# Patient Record
Sex: FEMALE | Race: BLACK OR AFRICAN AMERICAN | Employment: FULL TIME | URBAN - METROPOLITAN AREA
[De-identification: names, ages, dates, MRNs, and addresses within clinical notes are randomized per-mention and may not be internally consistent; named-entity substitution may affect disease eponyms.]

---

## 2019-06-14 DIAGNOSIS — R00.0 TACHYCARDIA: Primary | ICD-10-CM

## 2019-06-17 ENCOUNTER — NURSE ONLY (OUTPATIENT)
Dept: CARDIOLOGY CLINIC | Age: 54
End: 2019-06-17

## 2019-06-17 DIAGNOSIS — R00.0 TACHYCARDIA: Primary | ICD-10-CM

## 2019-06-17 PROCEDURE — 0296T PR EXT ECG > 48HR TO 21 DAY RCRD W/CONECT INTL RCRD: CPT | Performed by: INTERNAL MEDICINE

## 2019-06-20 ENCOUNTER — TELEPHONE (OUTPATIENT)
Dept: CARDIOLOGY CLINIC | Age: 54
End: 2019-06-20

## 2019-06-20 NOTE — TELEPHONE ENCOUNTER
Pt took the monitor off. Pt said it started falling off and her skin was starting to bubble. Pt has mailed the monitor.

## 2019-06-20 NOTE — TELEPHONE ENCOUNTER
Per Dr. Javier richards to return monitor. We will discuss her palpitations at her visit on 7/26/19.

## 2019-06-25 PROCEDURE — 0298T PR EXT ECG > 48HR TO 21 DAY REVIEW AND INTERPRETATN: CPT | Performed by: INTERNAL MEDICINE

## 2019-06-26 ENCOUNTER — TELEPHONE (OUTPATIENT)
Dept: CARDIOLOGY CLINIC | Age: 54
End: 2019-06-26

## 2019-06-26 DIAGNOSIS — R00.0 TACHYCARDIA: ICD-10-CM

## 2019-06-26 NOTE — TELEPHONE ENCOUNTER
Dr Jc Torres this patient's Cardia Monitor have been scanned to her chart. Please give final findings.  Thanks

## 2019-06-26 NOTE — TELEPHONE ENCOUNTER
Monitor shows normal rhythm with rare premature beats from the top chambers of the heart of no clinical concern in terms of harm.

## 2019-06-27 NOTE — TELEPHONE ENCOUNTER
Spoke to pt with monitor results per [facility]. She verbalized understanding. The pt has noticed a fluctuation in BP recently. She will continue to monitor her BP and bring a log when she comes to see [facility] 7/26/19.

## 2019-07-25 PROBLEM — I10 ESSENTIAL HYPERTENSION: Status: ACTIVE | Noted: 2019-07-25

## 2019-07-26 ENCOUNTER — OFFICE VISIT (OUTPATIENT)
Dept: CARDIOLOGY CLINIC | Age: 54
End: 2019-07-26
Payer: COMMERCIAL

## 2019-07-26 VITALS
SYSTOLIC BLOOD PRESSURE: 110 MMHG | BODY MASS INDEX: 33.49 KG/M2 | HEIGHT: 62 IN | HEART RATE: 86 BPM | DIASTOLIC BLOOD PRESSURE: 80 MMHG | WEIGHT: 182 LBS

## 2019-07-26 DIAGNOSIS — R00.0 TACHYCARDIA: ICD-10-CM

## 2019-07-26 DIAGNOSIS — R00.2 PALPITATIONS: ICD-10-CM

## 2019-07-26 DIAGNOSIS — I10 ESSENTIAL HYPERTENSION: Primary | ICD-10-CM

## 2019-07-26 PROCEDURE — 99204 OFFICE O/P NEW MOD 45 MIN: CPT | Performed by: INTERNAL MEDICINE

## 2019-07-26 RX ORDER — METOPROLOL SUCCINATE 50 MG/1
50 TABLET, EXTENDED RELEASE ORAL DAILY
Qty: 90 TABLET | Refills: 3 | Status: SHIPPED | OUTPATIENT
Start: 2019-07-26 | End: 2019-11-13 | Stop reason: SDUPTHER

## 2019-07-26 RX ORDER — METOPROLOL SUCCINATE 25 MG/1
TABLET, EXTENDED RELEASE ORAL
Refills: 2 | COMMUNITY
Start: 2019-07-10 | End: 2019-07-26

## 2019-07-26 RX ORDER — AMLODIPINE BESYLATE 5 MG/1
TABLET ORAL
COMMUNITY
Start: 2018-12-27 | End: 2019-11-13 | Stop reason: SDUPTHER

## 2019-07-26 RX ORDER — NICOTINE POLACRILEX 2 MG
1 GUM BUCCAL
COMMUNITY
End: 2020-09-15

## 2019-07-26 RX ORDER — METHOCARBAMOL 500 MG/1
TABLET, FILM COATED ORAL
COMMUNITY
Start: 2018-11-27 | End: 2019-11-07 | Stop reason: ALTCHOICE

## 2019-07-26 SDOH — HEALTH STABILITY: MENTAL HEALTH: HOW OFTEN DO YOU HAVE A DRINK CONTAINING ALCOHOL?: NEVER

## 2019-07-26 NOTE — PROGRESS NOTES
[address]    2019    Chadd Jorge (:  1965) is a 47 y.o. female who is self referred to establish with our practice for management of hypertension, palpitations, and shortness of breath. Referring Provider: No primary care provider on file. HISTORY:  Ms Jamin Calderon was followed by a cardiologist in [facility]. She is now spending most of her time in the [facility] and wants to get established with a cardiologist at [facility]. According to the [place] cardiology records, she has a history of hypertension, tachycardia, shortness of breath. In 2018, Amlodipine and HCTZ (developed \"alligator\" skin and stopped taking HCTZ) and added to Metoprolol for blood pressure control. 2018 Holter demonstrated SR with avg HR 62, < 1% PAC and PVC burden. Her son  from complications of malignant hypertensive heart disease    Today, she is here for cardiac evaluation of hypertension and palpitations. She enjoys going to the gym and her usual routine is to exercise for 45-60 minutes per session which includes stretching, strength training, and would finish with cardio. Over the past 6-8 weeks, she noticed heart rates increasing to 160's with exercise (per [employer]) which was unusual for her. She noted both heart racing and heart pounding, associated with shortness of breath when exercising. She denies exertional chest pain, shortness of breath with normal activities, stairs etc.  She cut back on exercising until her appointment today. She wore a cardiac monitor for 2-3 days, but she is allergic to Latex and the patch irritated her skin. She did not experience significant symptoms when wearing the monitor, but symptoms have become more frequent since then. She does not smoke and avoids caffeine or energy drinks. Her BP tends to be higher in the morning and with exercise. Since she has not been exercising as much, her BP seems to be better controlled.   BP at home can be

## 2019-07-26 NOTE — PATIENT INSTRUCTIONS
1.  Target heart rate with exercise 130-140's  2.  Start with aerobic exercise for two weeks then incorporate strength training afterwards  3. No change in Amlodipine dose (5 mg daily)  4. Increase Metoprolol XL to 50 mg daily and continue to monitor heart rate and blood pressure. Send message through [id number] in a few weeks with an update  5.   Follow up in November 2019

## 2019-08-16 ENCOUNTER — TELEPHONE (OUTPATIENT)
Dept: CARDIOLOGY CLINIC | Age: 54
End: 2019-08-16

## 2019-10-29 ENCOUNTER — TELEPHONE (OUTPATIENT)
Dept: CARDIOLOGY CLINIC | Age: 54
End: 2019-10-29

## 2019-11-07 ENCOUNTER — OFFICE VISIT (OUTPATIENT)
Dept: CARDIOLOGY CLINIC | Age: 54
End: 2019-11-07
Payer: COMMERCIAL

## 2019-11-07 VITALS
BODY MASS INDEX: 34.37 KG/M2 | WEIGHT: 186.8 LBS | OXYGEN SATURATION: 95 % | HEIGHT: 62 IN | SYSTOLIC BLOOD PRESSURE: 112 MMHG | DIASTOLIC BLOOD PRESSURE: 82 MMHG | HEART RATE: 65 BPM

## 2019-11-07 DIAGNOSIS — R00.0 TACHYCARDIA: ICD-10-CM

## 2019-11-07 DIAGNOSIS — G47.33 OSA (OBSTRUCTIVE SLEEP APNEA): ICD-10-CM

## 2019-11-07 DIAGNOSIS — R06.02 SHORTNESS OF BREATH: ICD-10-CM

## 2019-11-07 DIAGNOSIS — I10 ESSENTIAL HYPERTENSION: Primary | ICD-10-CM

## 2019-11-07 DIAGNOSIS — R00.2 PALPITATIONS: ICD-10-CM

## 2019-11-07 DIAGNOSIS — R06.02 EXERTIONAL SHORTNESS OF BREATH: ICD-10-CM

## 2019-11-07 DIAGNOSIS — R00.1 BRADYCARDIA: ICD-10-CM

## 2019-11-07 PROCEDURE — 99214 OFFICE O/P EST MOD 30 MIN: CPT | Performed by: INTERNAL MEDICINE

## 2019-11-07 PROCEDURE — 0296T PR EXT ECG > 48HR TO 21 DAY RCRD W/CONECT INTL RCRD: CPT | Performed by: INTERNAL MEDICINE

## 2019-11-07 RX ORDER — HYDROCHLOROTHIAZIDE 12.5 MG/1
TABLET ORAL
COMMUNITY
Start: 2018-12-27 | End: 2020-09-11 | Stop reason: ALTCHOICE

## 2019-11-08 ENCOUNTER — TELEPHONE (OUTPATIENT)
Dept: CARDIOLOGY CLINIC | Age: 54
End: 2019-11-08

## 2019-11-11 ENCOUNTER — TELEPHONE (OUTPATIENT)
Dept: CARDIOLOGY CLINIC | Age: 54
End: 2019-11-11

## 2019-11-12 ENCOUNTER — OFFICE VISIT (OUTPATIENT)
Dept: PULMONOLOGY | Age: 54
End: 2019-11-12
Payer: COMMERCIAL

## 2019-11-12 VITALS
WEIGHT: 191 LBS | OXYGEN SATURATION: 99 % | BODY MASS INDEX: 35.15 KG/M2 | HEIGHT: 62 IN | HEART RATE: 49 BPM | SYSTOLIC BLOOD PRESSURE: 119 MMHG | DIASTOLIC BLOOD PRESSURE: 84 MMHG

## 2019-11-12 DIAGNOSIS — E66.9 NON MORBID OBESITY, UNSPECIFIED OBESITY TYPE: Chronic | ICD-10-CM

## 2019-11-12 DIAGNOSIS — I10 ESSENTIAL HYPERTENSION: Chronic | ICD-10-CM

## 2019-11-12 DIAGNOSIS — G47.33 OBSTRUCTIVE SLEEP APNEA (ADULT) (PEDIATRIC): Primary | ICD-10-CM

## 2019-11-12 PROCEDURE — 99204 OFFICE O/P NEW MOD 45 MIN: CPT | Performed by: INTERNAL MEDICINE

## 2019-11-12 ASSESSMENT — ENCOUNTER SYMPTOMS
PHOTOPHOBIA: 0
CHEST TIGHTNESS: 0
APNEA: 0
ALLERGIC/IMMUNOLOGIC NEGATIVE: 1
ABDOMINAL DISTENTION: 0
VOMITING: 0
ABDOMINAL PAIN: 0
CHOKING: 0
NAUSEA: 0
SHORTNESS OF BREATH: 0
RHINORRHEA: 0
EYE PAIN: 0

## 2019-11-12 ASSESSMENT — SLEEP AND FATIGUE QUESTIONNAIRES
HOW LIKELY ARE YOU TO NOD OFF OR FALL ASLEEP WHILE SITTING AND TALKING TO SOMEONE: 0
HOW LIKELY ARE YOU TO NOD OFF OR FALL ASLEEP WHILE SITTING INACTIVE IN A PUBLIC PLACE: 0
HOW LIKELY ARE YOU TO NOD OFF OR FALL ASLEEP WHILE WATCHING TV: 0
NECK CIRCUMFERENCE (INCHES): 15
HOW LIKELY ARE YOU TO NOD OFF OR FALL ASLEEP WHEN YOU ARE A PASSENGER IN A CAR FOR AN HOUR WITHOUT A BREAK: 0
ESS TOTAL SCORE: 0
HOW LIKELY ARE YOU TO NOD OFF OR FALL ASLEEP WHILE SITTING AND READING: 0
HOW LIKELY ARE YOU TO NOD OFF OR FALL ASLEEP IN A CAR, WHILE STOPPED FOR A FEW MINUTES IN TRAFFIC: 0
HOW LIKELY ARE YOU TO NOD OFF OR FALL ASLEEP WHILE SITTING QUIETLY AFTER LUNCH WITHOUT ALCOHOL: 0
HOW LIKELY ARE YOU TO NOD OFF OR FALL ASLEEP WHILE LYING DOWN TO REST IN THE AFTERNOON WHEN CIRCUMSTANCES PERMIT: 0

## 2019-11-13 RX ORDER — METOPROLOL SUCCINATE 50 MG/1
50 TABLET, EXTENDED RELEASE ORAL DAILY
Qty: 90 TABLET | Refills: 3 | Status: SHIPPED | OUTPATIENT
Start: 2019-11-13 | End: 2020-02-03 | Stop reason: SDUPTHER

## 2019-11-13 RX ORDER — AMLODIPINE BESYLATE 5 MG/1
5 TABLET ORAL DAILY
Qty: 90 TABLET | Refills: 3 | Status: SHIPPED | OUTPATIENT
Start: 2019-11-13 | End: 2020-02-03 | Stop reason: SDUPTHER

## 2019-11-21 ENCOUNTER — TELEPHONE (OUTPATIENT)
Dept: CARDIOLOGY CLINIC | Age: 54
End: 2019-11-21

## 2019-11-21 ENCOUNTER — HOSPITAL ENCOUNTER (OUTPATIENT)
Dept: NON INVASIVE DIAGNOSTICS | Age: 54
Discharge: HOME | End: 2019-11-21
Payer: COMMERCIAL

## 2019-11-21 DIAGNOSIS — R06.02 SHORTNESS OF BREATH: ICD-10-CM

## 2019-11-21 LAB
LV EF: 50 %
LVEF MODALITY: NORMAL

## 2019-11-21 PROCEDURE — 93351 STRESS TTE COMPLETE: CPT

## 2019-11-21 PROCEDURE — 93320 DOPPLER ECHO COMPLETE: CPT

## 2019-11-22 ENCOUNTER — OFFICE VISIT (OUTPATIENT)
Dept: CARDIOLOGY CLINIC | Age: 54
End: 2019-11-22
Payer: COMMERCIAL

## 2019-11-22 ENCOUNTER — HOSPITAL ENCOUNTER (OUTPATIENT)
Age: 54
Discharge: HOME | End: 2019-11-22
Payer: COMMERCIAL

## 2019-11-22 VITALS
BODY MASS INDEX: 34.78 KG/M2 | WEIGHT: 189 LBS | DIASTOLIC BLOOD PRESSURE: 74 MMHG | RESPIRATION RATE: 18 BRPM | HEART RATE: 69 BPM | SYSTOLIC BLOOD PRESSURE: 127 MMHG | HEIGHT: 62 IN

## 2019-11-22 DIAGNOSIS — R00.2 PALPITATIONS: ICD-10-CM

## 2019-11-22 DIAGNOSIS — E66.9 NON MORBID OBESITY, UNSPECIFIED OBESITY TYPE: Chronic | ICD-10-CM

## 2019-11-22 DIAGNOSIS — R00.0 TACHYCARDIA: Primary | ICD-10-CM

## 2019-11-22 DIAGNOSIS — G47.33 OSA (OBSTRUCTIVE SLEEP APNEA): Chronic | ICD-10-CM

## 2019-11-22 DIAGNOSIS — I10 ESSENTIAL HYPERTENSION: Chronic | ICD-10-CM

## 2019-11-22 DIAGNOSIS — R00.1 BRADYCARDIA: ICD-10-CM

## 2019-11-22 DIAGNOSIS — R00.0 TACHYCARDIA: ICD-10-CM

## 2019-11-22 LAB — TSH REFLEX: 1.56 UIU/ML (ref 0.27–4.2)

## 2019-11-22 PROCEDURE — 93000 ELECTROCARDIOGRAM COMPLETE: CPT | Performed by: INTERNAL MEDICINE

## 2019-11-22 PROCEDURE — 84443 ASSAY THYROID STIM HORMONE: CPT

## 2019-11-22 PROCEDURE — 36415 COLL VENOUS BLD VENIPUNCTURE: CPT

## 2019-11-22 PROCEDURE — 99204 OFFICE O/P NEW MOD 45 MIN: CPT | Performed by: INTERNAL MEDICINE

## 2019-11-22 PROCEDURE — 0298T PR EXT ECG > 48HR TO 21 DAY REVIEW AND INTERPRETATN: CPT | Performed by: INTERNAL MEDICINE

## 2019-11-22 RX ORDER — MAGNESIUM OXIDE 400 MG/1
400 TABLET ORAL DAILY
Qty: 90 TABLET | Refills: 3
Start: 2019-11-22 | End: 2020-09-15

## 2019-11-27 ENCOUNTER — TELEPHONE (OUTPATIENT)
Dept: CARDIOLOGY CLINIC | Age: 54
End: 2019-11-27

## 2020-01-28 NOTE — PROGRESS NOTES
[address]    2/3/2020    Ivan Islas (:  1965) is a 49 y.o. female who is here for follow up on her history of hypertension and palpitations and to review results of diagnostic testing since last visit. Referring Provider: Don Leonadro MD    HISTORY:  Ms Popeye Bean has a history of hypertension, tachycardia, and shortness of breath. She has sleep apnea treated with CPAP. She was previously followed by a cardiologist in [place], [place]. Her son  from complications of malignant hypertensive heart disease. In 2018, Amlodipine and HCTZ were added to Metoprolol XL, however she stopped taking HCTZ after she developed  \"alligator\" skin. BP at home can be as high as 149-150/90. She also noticed better blood pressure control when taking BP meds twice a day. 2018 Holter demonstrated SR with avg HR 62, < 1% PAC and PVC burden. Earlier in , she noticed her heart rate would increase to 160 bpm with exercise which was unusual for her. She noted both heart racing and heart pounding, associated with shortness of breath when exercising. She denied exertional chest pain, shortness of breath with normal activities, stairs etc.  She is experiencing more palpitations than she did when she wore the cardiac monitor. She will be going to [place] for six months for her job with the [employer]. 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 She has used HCTZ in the past, but has an allergy to sulfa. Today, she states she tries to avoid use of anti-histamines to avoid triggering palpitations. She does not wear her Apple watch anymore as it did not monitor her heart rate accurately. She recently started exercising again. She denies exertional chest pain or shortness of breath. She has not been bothered by palpitations recently. She has not been taking HCTZ regularly, but will take it for worsening swelling.   She will be Alcohol use: Never     Frequency: Never        Family History   Problem Relation Age of Onset    Hypertension Mother     Cancer Mother     Hypertension Father     Diabetes Father     Cancer Father        PHYSICAL EXAMINATION:  Vitals:    02/03/20 0905   BP: 110/70   Site: Left Upper Arm   Position: Sitting   Cuff Size: Large Adult   Pulse: 66   Resp: 18   SpO2: 98%   Weight: 196 lb 1.9 oz (89 kg)   Height: 5' 2\" (1.575 m)     Estimated body mass index is 35.87 kg/m² as calculated from the following:    Height as of this encounter: 5' 2\" (1.575 m). Weight as of this encounter: 196 lb 1.9 oz (89 kg). General Appearance: No apparent distress  Eyes:  · Conjunctiva clear  · Pupils equal, round, reactive to light  ENT:  · External Ears and Nose unremarkable  · Oral mucosa is moist  Respiratory:  · Normal excursion and expansion without use of accessory muscles  · Resp Auscultation: Normal breath sounds without dullness  Cardiovascular:  · JVD is normal  · The carotid upstroke is normal in amplitude and contour without delay or bruit  · Apical impulse is not displaced  · Normal S1, S2. No S3. No Murmur. Regular rate and rhythm - no change in medication  · No edema  · Pedal Pulses: 2+ and equal   Abdomen:  · No masses or tenderness  · Liver/Spleen: No Abnormalities Noted  Musculoskeletal:  · Fingers without clubbing or cyanosis  · Normal Gait  Skin:  · No rash  · Normal skin turgor   Neurologic/Psychiatric:  · Alert and oriented in all spheres  · Normal mood and affect  · Memory and mentation intact      I have reviewed all pertinent lab results and diagnostic testing. Holter monitor (7 days) 11/22/19 -- SR with avg HR 66 (). PAC and PVC burden < 1%    Stress echo 11/21/19:  Summary   Normal stress echocardiogram study. Exercises 9 min, 42 sec.   Peak HR with exercise - 167 bpm    Echo 11/21/19:   Summary   -Normal left ventricle size, wall thickness, and systolic function with an   estimated

## 2020-02-03 ENCOUNTER — OFFICE VISIT (OUTPATIENT)
Dept: CARDIOLOGY CLINIC | Age: 55
End: 2020-02-03
Payer: COMMERCIAL

## 2020-02-03 VITALS
OXYGEN SATURATION: 98 % | SYSTOLIC BLOOD PRESSURE: 110 MMHG | HEART RATE: 66 BPM | RESPIRATION RATE: 18 BRPM | HEIGHT: 62 IN | BODY MASS INDEX: 36.09 KG/M2 | WEIGHT: 196.12 LBS | DIASTOLIC BLOOD PRESSURE: 70 MMHG

## 2020-02-03 PROCEDURE — 99213 OFFICE O/P EST LOW 20 MIN: CPT | Performed by: INTERNAL MEDICINE

## 2020-02-03 RX ORDER — METOPROLOL SUCCINATE 50 MG/1
50 TABLET, EXTENDED RELEASE ORAL DAILY
Qty: 210 TABLET | Refills: 1 | Status: SHIPPED | OUTPATIENT
Start: 2020-02-03 | End: 2020-07-09

## 2020-02-03 RX ORDER — AMLODIPINE BESYLATE 5 MG/1
5 TABLET ORAL DAILY
Qty: 210 TABLET | Refills: 1 | Status: SHIPPED | OUTPATIENT
Start: 2020-02-03 | End: 2020-02-05

## 2020-02-03 NOTE — LETTER
[address] Cardiology [facility]  [address] [address]  Phone: [phone]  Fax: [phone]    Jona Jade MD        2020     Marge Salinas MD  [address] [id number]    Patient: Estelle Pino  MR Number: [id number]  YOB: 1965  Date of Visit: 2/3/2020    Dear Dr. Marge Salinas:    Below are the relevant portions of my assessment and plan of care. [address]    2/3/2020    Estelle Pino (:  1965) is a 49 y.o. female who is here for follow up on her history of hypertension and palpitations and to review results of diagnostic testing since last visit. Referring Provider: Marge Salinas MD    HISTORY:  Ms Aguilar Agee has a history of hypertension, tachycardia, and shortness of breath. She has sleep apnea treated with CPAP. She was previously followed by a cardiologist in [place], [place]. Her son  from complications of malignant hypertensive heart disease. In 2018, Amlodipine and HCTZ were added to Metoprolol XL, however she stopped taking HCTZ after she developed  \"alligator\" skin. BP at home can be as high as 149-150/90. She also noticed better blood pressure control when taking BP meds twice a day. 2018 Holter demonstrated SR with avg HR 62, < 1% PAC and PVC burden. Earlier in 2019, she noticed her heart rate would increase to 160 bpm with exercise which was unusual for her. She noted both heart racing and heart pounding, associated with shortness of breath when exercising. She denied exertional chest pain, shortness of breath with normal activities, stairs etc.  She is experiencing more palpitations than she did when she wore the cardiac monitor. She will be going to [place] for six months for her job with the [employer]. 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   She has used HCTZ in the past, but has an allergy to sulfa. Today, she states she tries to avoid use of anti-histamines to avoid triggering palpitations. She does not wear her Apple watch anymore as it did not monitor her heart rate accurately. She recently started exercising again. She denies exertional chest pain or shortness of breath. She has not been bothered by palpitations recently. She has not been taking HCTZ regularly, but will take it for worsening swelling. She will be leaving out of the country for six months for her work and expects to have more fluid retention on the flight to the [place].  She also wants to take 7 month supply of medications with her to [place]. She has reduced her salt/sodium intake. Patient is compliant with medications and is tolerating them well without side effects. REVIEW OF SYSTEMS:  A complete review of systems was reviewed and is negative except as noted in the history of present illness. Prior to Visit Medications    Medication Sig Taking?  Authorizing Provider   magnesium oxide (MAG-OX) 400 MG tablet Take 1 tablet by mouth daily Yes Pastor Apurva MD   metoprolol succinate (TOPROL XL) 50 MG extended release tablet Take 1 tablet by mouth daily Yes Omid Lyon MD   amLODIPine (NORVASC) 5 MG tablet Take 1 tablet by mouth daily Yes Omid Lyon MD   Omega-3 Fatty Acids (FISH OIL PO) Take 750 mg by mouth 2 times daily Yes Historical Provider, MD   Cholecalciferol (VITAMIN D-3 PO) Take 5,000 Units by mouth daily Yes Historical Provider, MD   Aluminum Chloride in Alcohol 6.25 % SOLN Apply small amount under arms qhs, avoid broken skin Yes Historical Provider, MD   Biotin 1 MG CAPS Take 1 mg by mouth Yes Historical Provider, MD   aspirin 81 MG tablet Take 81 mg by mouth daily Yes Historical Provider, MD   Multiple Vitamin (MULTI VITAMIN DAILY PO) Take 1 tablet by mouth daily Yes Historical Provider, MD Neurologic/Psychiatric:  · Alert and oriented in all spheres  · Normal mood and affect  · Memory and mentation intact      I have reviewed all pertinent lab results and diagnostic testing. Holter monitor (7 days) 11/22/19 -- SR with avg HR 66 (). PAC and PVC burden < 1%    Stress echo 11/21/19:  Summary   Normal stress echocardiogram study. Exercises 9 min, 42 sec. Peak HR with exercise - 167 bpm    Echo 11/21/19:   Summary   -Normal left ventricle size, wall thickness, and systolic function with an   estimated ejection fraction of 55%. -Grade I diastolic dysfunction with normal LV filling pressures. -Mild mitral regurgitation.   -Mild tricuspid regurgitation with PASP of 27 mmHg. 48 hr holter  6/17/19:  SR, avg HR 70 ( ). Rare PVCs noted, Rare PACs. Carotid duplex 2/2019:  Normal carotid arteries bilaterally    Plain GXT 2/2018 : walked 9 minutes, 43 seconds. ECG negative for ischemia    Echo 12/2018: LV normal size and wall thickness. LVEF hyperdynamic > 70%, impaired LV relaxation. Trace MR and TR    Holter monitor 12/2018:  SR with avg HR 62 (), PVC burden < 1% (80 beats); PAC burden < 1% 995 beats)      ASSESSMENT/PLAN:    1. Essential hypertension  ~ at initial visit patient reported BP that are higher in the morning and after exercising.    ~ Metoprolol XL 50 mg daily, Amlodipine 5 mg daily. Tolerating medications without side effects  ~ Blood pressure 110/70, pulse 66, resp. rate 18, height 5' 2\" (1.575 m), weight 196 lb 1.9 oz (89 kg), SpO2 98 %, not currently breastfeeding. Plan > BP well controlled, continue present management. 2. Tachycardia/palpitations  3.   Exertional shortness of breath   ~ noticed heart racing and pounding with HR as high as 160's with exercise.    ~ rare PVCs noted on Holter monitor in 12/2018, no PVCs noted on recent cardiac monitor in June 2019, but wore it less than 3 days due to skin irritation ~ previous visit - reported fluctuation in HR which is as low as 38 bpm at night and can spike up to 160 bpm when walking 1-2 mph on the treadmill. HR low 100's with normal activities around the house. She felt winded at times with HR low 100's and spikes up to 160's  ~ 11/2019 TSH - 1.56  ~ 11/2019 Holter monitor - SR with avg HR 66 (), no significant arrhythmias  ~ Dr. Milagros Rosario recommended Magnesium oxide 400 mg daily for palpitations. Tolerating medications without side effects  ~ 11/21/19 stress echo normal.  Good exercise tolerance (over 9 minutes), peak HR was exercise - 167.    ~ no concerning palpitations recently  ~ discuss avoiding antihistamines or decongestants with \"D\" or \"DM\"    Plan >  Okay to exercise without limitations. Okay for HR to increase to 85% of predicted heart rate. Can take extra 1/2 tab of Metoprolol as needed for increased palpitations. 4.  MACK  ~ treated with CPAP - is compliant with use  ~ 11/2019 holter monitor showed low HR of 50 bpm    Plan > continue use of CPAP      Plan:  1. No change in medications  2. Can take extra 1/2 tab Metoprolol as needed for worsening palpitations. Hold dose if heart rate is less than 50.    3.  Can hold Amlodipine if SBP (top #) is less than 100 mmHg or if you take extra 1/2 tab of Metoprolol for worsening palpitations. 4.  Avoid medication with \"D\" in it. Okay to take Claritin, Zyrtec etc.   5.  Okay to exercise - no limitations. Can increase heart rate to 85% of predicted heart rate. Acceptable BP less than 140/90.    6.  Will refill meds so patient can take 7 month supply of meds with her when traveling for work. 7.  Follow up in Oct-Nov 2020       Scribe's attestation: This note was scribed in the presence of Sivan Kinney M.D. by Clover Farmer RN    Physician Attestation: The scribe's documentation has been prepared under my direction and personally reviewed by me in its entirety.   I confirm that the note above accurately reflects all work, treatment, procedures, and medical decision making performed by me. An  electronic signature was used to authenticate this note. Juana Chin MD, Tarsha Dickson      If you have questions, please do not hesitate to call me. I look forward to following Christian Fortune along with you.     Sincerely,        Iam Huizar MD

## 2020-02-05 RX ORDER — AMLODIPINE BESYLATE 5 MG/1
TABLET ORAL
Qty: 210 TABLET | Refills: 1 | Status: SHIPPED | OUTPATIENT
Start: 2020-02-05 | End: 2021-08-20 | Stop reason: SDUPTHER

## 2020-07-09 NOTE — TELEPHONE ENCOUNTER
Requested Prescriptions     Pending Prescriptions Disp Refills    metoprolol succinate (TOPROL XL) 50 MG extended release tablet [Pharmacy Med Name: METOPROLOL ER SUCCINATE 50MG TABS] 90 tablet      Sig: TAKE 1 TABLET BY MOUTH DAILY          Number: 90    Refills: 3    Last Office Visit: 2/3/2020     Next Office Visit: Visit date not found

## 2020-07-10 RX ORDER — METOPROLOL SUCCINATE 50 MG/1
50 TABLET, EXTENDED RELEASE ORAL DAILY
Qty: 90 TABLET | Refills: 3 | Status: SHIPPED | OUTPATIENT
Start: 2020-07-10 | End: 2020-09-15

## 2020-09-11 ENCOUNTER — VIRTUAL VISIT (OUTPATIENT)
Dept: PRIMARY CARE CLINIC | Age: 55
End: 2020-09-11
Payer: COMMERCIAL

## 2020-09-11 DIAGNOSIS — M12.9 ARTHROPATHY: ICD-10-CM

## 2020-09-11 DIAGNOSIS — R68.2 DRY MOUTH: ICD-10-CM

## 2020-09-11 PROBLEM — R00.0 TACHYCARDIA: Status: RESOLVED | Noted: 2019-07-26 | Resolved: 2020-09-11

## 2020-09-11 PROBLEM — R00.2 PALPITATIONS: Status: RESOLVED | Noted: 2019-07-26 | Resolved: 2020-09-11

## 2020-09-11 PROBLEM — R06.02 EXERTIONAL SHORTNESS OF BREATH: Status: RESOLVED | Noted: 2019-11-07 | Resolved: 2020-09-11

## 2020-09-11 PROCEDURE — 99203 OFFICE O/P NEW LOW 30 MIN: CPT | Performed by: INTERNAL MEDICINE

## 2020-09-11 ASSESSMENT — ENCOUNTER SYMPTOMS
WHEEZING: 0
SHORTNESS OF BREATH: 0
RHINORRHEA: 0
NAUSEA: 0
VISUAL CHANGE: 0
VOMITING: 0
COUGH: 0
TROUBLE SWALLOWING: 0
CHEST TIGHTNESS: 0
ABDOMINAL PAIN: 0
EYE PAIN: 0
SORE THROAT: 0
EYE DISCHARGE: 0
SINUS PAIN: 0
BLOOD IN STOOL: 0
SWOLLEN GLANDS: 0
SINUS PRESSURE: 0

## 2020-09-11 NOTE — PATIENT INSTRUCTIONS
Keep the dentist appointment next week to see if anything else they noticed. She may have Sjogren's syndrome and I am going to order the INDRA testing with her arthritis and if it is causing dryness in the mouth and italo feeling as she is drinking enough fluids. Keep CPAP machine quite clean make sure that this not causing this problem. She may be breathing through the mouth and daytime and that could be causing  the dry mouth. If Sjogren test is negative she should see ENT specialist and if dentist does not find any other problem either.

## 2020-09-11 NOTE — PROGRESS NOTES
2020    Mavis Schmitt (: 1965) is a 49 y.o. female, here for evaluation of the following medical concerns:    Chief Complaint   Patient presents with   [address]       She had seen Dr. Marilynne Lesches and did blood work and the blood work she has seen the results and Dr. Marilynne Lesches explained to her that stop iron-containing medication and she was still questioning that and explained to her maybe iron rich foods especially the red meats she needs to quit eating those and try to eat more white meat and see how that does. She does not eat any vitamin B12 and her vitamin D was normal too. Blood work reviewed and she does not show any dehydration or elevated sugar and her liver function test is normal.      She denies any dryness in the eyes and has normal tearing but mouth dryness wonder about Sjogren's syndrome or mouth dryness and that she has dentist follow-up next week and they will check it out and if needed further checkup she may have to see ENT specialist.  She understood all those details. Hypertension    Watching low-sodium diet. Taking blood pressure medications regularly. Blood pressure checked off and on and trying to keep a goal of blood pressure less than 130/85 most of the time. Denies any chest pain / palpitation / shortness of breath / lightheadedness etc.   No results found for: NA, K, CL, CO2, BUN, CREATININE, GLUCOSE, CALCIUM        Sleep apnea--keep changing water / cleaning machine--no recent chemical change      arthritis she does have multiple joints especially knees and only taking as needed medications. Other   This is a new (mouth italo lesions x 2 months) problem. The current episode started more than 1 month ago. The problem occurs constantly. The problem has been unchanged.  Pertinent negatives include no abdominal pain, chest pain, chills, congestion, coughing, diaphoresis, fever, headaches, myalgias, nausea, neck pain, numbness, rash, sore throat, swollen glands, vertigo, visual change, vomiting or weakness. Associated symptoms comments: Throat dry. Review of Systems   Constitutional: Negative for appetite change, chills, diaphoresis, fever and unexpected weight change. HENT: Negative for congestion, ear discharge, ear pain, nosebleeds, rhinorrhea, sinus pressure, sinus pain, sore throat and trouble swallowing. Mouth dry / italo feeling   Eyes: Negative for pain and discharge. Respiratory: Negative for cough, chest tightness, shortness of breath and wheezing. Cardiovascular: Negative for chest pain, palpitations and leg swelling. Gastrointestinal: Negative for abdominal pain, blood in stool, nausea and vomiting. Endocrine: Negative for polydipsia and polyphagia. Genitourinary: Negative for difficulty urinating, enuresis, flank pain and hematuria. Musculoskeletal: Negative for myalgias and neck pain. Skin: Negative for rash. Neurological: Negative for vertigo, facial asymmetry, weakness, light-headedness, numbness and headaches. Psychiatric/Behavioral: Negative for confusion. Current Outpatient Medications on File Prior to Visit   Medication Sig Dispense Refill    metoprolol succinate (TOPROL XL) 50 MG extended release tablet TAKE 1 TABLET BY MOUTH DAILY 90 tablet 3    amLODIPine (NORVASC) 5 MG tablet TAKE 1 TABLET BY MOUTH EVERY  tablet 1    magnesium oxide (MAG-OX) 400 MG tablet Take 1 tablet by mouth daily 90 tablet 3    Omega-3 Fatty Acids (FISH OIL PO) Take 750 mg by mouth 2 times daily      Cholecalciferol (VITAMIN D-3 PO) Take 5,000 Units by mouth daily      Aluminum Chloride in Alcohol 6.25 % SOLN Apply small amount under arms qhs, avoid broken skin      Biotin 1 MG CAPS Take 1 mg by mouth      aspirin 81 MG tablet Take 81 mg by mouth daily      Multiple Vitamin (MULTI VITAMIN DAILY PO) Take 1 tablet by mouth daily       No current facility-administered medications on file prior to visit.        Allergies Allergen Reactions    Ciprofloxacin Shortness Of Breath and Palpitations    Codeine Itching    Sulfur Itching    Vicodin [Hydrocodone-Acetaminophen] Itching     Past Medical History:   Diagnosis Date    Hypertension     MACK (obstructive sleep apnea) 11/7/2019     Past Surgical History:   Procedure Laterality Date    HYSTERECTOMY      LAP BAND        Social History     Tobacco Use    Smoking status: Never Smoker    Smokeless tobacco: Never Used   Substance Use Topics    Alcohol use: Never     Frequency: Never      Family History   Problem Relation Age of Onset    Hypertension Mother     Cancer Mother     Hypertension Father     Diabetes Father     Cancer Father         There were no vitals filed for this visit. Estimated body mass index is 35.87 kg/m² as calculated from the following:    Height as of 2/3/20: 5' 2\" (1.575 m). Weight as of 2/3/20: 196 lb 1.9 oz (89 kg). Physical Exam  Constitutional:       Appearance: Normal appearance. HENT:      Nose: Nose normal.   Pulmonary:      Effort: Pulmonary effort is normal.   Musculoskeletal:      Comments: Arthritis in knee and multiple joints   Neurological:      General: No focal deficit present. Mental Status: She is alert. Psychiatric:         Mood and Affect: Mood normal.         Behavior: Behavior normal.         ASSESSMENT/PLAN:  1. Dry mouth  Keep dental checkup and Dr. Asa Shepherd follow-up. - INDRA Reflex to Antibody [place]; Future    2. Essential hypertension  Blood pressure medication    3. MACK (obstructive sleep apnea)  CPAP with proper cleaning    5. Jeannine Davies Nicely follow-up and this blood work  - INDRA Reflex to Antibody [facility]hu's; Future  Kenya Johnson is a 49 y.o. female being evaluated by a Virtual Visit (video visit) encounter to address concerns as mentioned above. A caregiver was present when appropriate.  Due to this being a TeleHealth encounter (During JIJ-53 public health emergency), evaluation of the following organ systems was limited: Vitals/Constitutional/EENT/Resp/CV/GI//MS/Neuro/Skin/Heme-Lymph-Imm. Pursuant to the emergency declaration under the [facility], [address] [place] [address] and the [employer] Act, this Virtual Visit was conducted with patient's (and/or legal guardian's) consent, to reduce the patient's risk of exposure to COVID-19 and provide necessary medical care. The patient (and/or legal guardian) has also been advised to contact this office for worsening conditions or problems, and seek emergency medical treatment and/or call 911 if deemed necessary. Patient identification was verified at the start of the visit: Yes    Total time spent for this encounter: 20 minutes 16 seconds  Services were provided through a video synchronous discussion virtually to substitute for in-person clinic visit. Patient and provider were located at their individual homes. --Luz Mera MD on 9/11/2020 at 12:16 PM    An electronic signature was used to authenticate this note. Return if symptoms worsen or fail to improve. Patient Instructions   Keep the dentist appointment next week to see if anything else they noticed. She may have Sjogren's syndrome and I am going to order the INDRA testing with her arthritis and if it is causing dryness in the mouth and italo feeling as she is drinking enough fluids. Keep CPAP machine quite clean make sure that this not causing this problem. She may be breathing through the mouth and daytime and that could be causing  the dry mouth. If Sjogren test is negative she should see ENT specialist and if dentist does not find any other problem either. Electronically signed by Luz Mera MD on 9/11/2020 at 12:16 PM     This dictation was generated by voice recognition computer software.  Although all attempts are made to edit the dictation for accuracy, there may be errors in the transcription that are not intended.

## 2020-09-12 LAB — ANTI-NUCLEAR ANTIBODY (ANA): NEGATIVE

## 2020-09-14 NOTE — PROGRESS NOTES
[facility]   Electrophysiology Follow Up  Date: 9/15/2020      CC: Tachycardia  HPI: Princess Manrique is a 49 y.o. female with a PMH of HTN and MACK. She presented to 's office on 11/7/19 with complaints of tachycardia and shortness of breath. A 7 day monitor was placed which revealed sinus rhythm and no arrhythmia. Sapna Alvarenga presents to the office d/t having symptoms. She is feeling fatigued and groggy. When she is exercising and gets half way through she has to stop to rest. She also has complaints of swelling when she is exercising. Her vitamin B-12 and  ferritin were elevated on her blood work 9/8/2020. Her PCP has stopped all her supplements. She is going to restart her magnesium. 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 We discussed that if she is exerting herself and her heart rate remains below her target heart rate of about 134 BPM she can decrease her Toprol. She logs her BP and she is concerned that her diastolic is slightly elevated in the AM.       Assessment and plan:      - Fatigue:    New complaint    Could be multifactorial related to being overweight, MACK and beta-blocker therapy   Will reduce metoprolol to 25 mg daily   Recommend weight loss and exercise   She will check her heart rate during exercise to see if she can increase it appropriately    Palpitations/Tachycardia   - Resolved. -ECG today shows Sinus rhythm   -TSH 11/22/2019 1.53   -Decrease her to Toprol XL 25 mg daily, to help with fatigue and possibly aiding in reaching her target heart rate   -magnesium 400 mg   -11/2019: 7-day outpatient Holter monitoring reviewed. Average heart rate of 66 with max heart rate of 103 minimum heart rate of 50 bpm   -Patient reports some symptoms during these 7 days of monitoring however no sustained arrhythmia has been noted. 48 hr holter  6/17/19:  SR, avg HR 70 ( ). No PVCs noted, Rare PACs.        HTN  Vitals:    09/15/20 0924   BP: 108/60   Pulse: 60   Resp: 18   SpO2: 99%    -Blood pressure is controlled   -Home BP monitoring encourage with a BP goal <130/80   -Decrease Toprol Xl to 25 mg to aid in her reaching her target HR   -Norvasc 5 mg daily, if her BP becomes uncontrolled with her decreased Toprol dose we will increase her amlodipine to 10 mg        MACK   -Uses a CPAP    Obesity  Body mass index is 34.75 kg/m². - Excessive weight is complicating assessment and treatment. It is placing patient at risk for multiple co-morbidities as well as early death and contributing to the patient's presentation. - discussed weight management with diet and exercise      Diagnostic testing  I independently reviewed the cardiac diagnostic studies. EC/15/20  Sinus rhythm    Stress Echo: 19   Results      Echo (rest): Normal (LVEF >50%)      Echo (stress): Hyperkinetic (LVEF >70%)      Echo   Baseline resting echocardiogram shows normal global LV systolic function   with an ejection fraction of 60% and uniform myocardial segmental wall   motion. Following stress there was uniform augmentation of all myocardial   segments with appropriate hyperdynamic LV systolic response to stress. ECG   Normal (Negative) response to exercise     Summary   -Normal left ventricle size, wall thickness, and systolic function with an   estimated ejection fraction of 55%. -Grade I diastolic dysfunction with normal LV filling pressures. -Mild mitral regurgitation.   -Mild tricuspid regurgitation with PASP of 27 mmHg.     Cath: none      Past Medical History:   Diagnosis Date    Hypertension     MACK (obstructive sleep apnea) 2019        Past Surgical History:   Procedure Laterality Date    HYSTERECTOMY      LAP BAND         Allergies   Allergen Reactions    Ciprofloxacin Shortness Of Breath and Palpitations    Codeine Itching    Sulfur Itching    Vicodin [Hydrocodone-Acetaminophen] Itching       Social History:   reports that she has never smoked. She has never used smokeless tobacco. She reports that she does not drink alcohol or use drugs. Family History:  family history includes Cancer in her father and mother; Diabetes in her father; Hypertension in her father and mother. Review of System:  [x] Full ROS obtained and negative except as mentioned in HPI    Physical Examination:  Vitals:    09/15/20 0924   BP: 108/60   Pulse: 60   Resp: 18   SpO2: 99%      Wt Readings from Last 3 Encounters:   09/15/20 190 lb (86.2 kg)   02/03/20 196 lb 1.9 oz (89 kg)   11/22/19 189 lb (85.7 kg)     · Constitutional: Oriented. No distress. · Head: Normocephalic and atraumatic. · Mouth/Throat: Oropharynx is clear and moist.   · Eyes: Conjunctivae normal. EOM are normal.   · Neck: Neck supple. No JVD present. · Cardiovascular: Normal rate, regular rhythm, S1&S2. · Pulmonary/Chest: Bilateral respiratory sounds. No rhonchi. · Abdominal: Soft. No tenderness. · Musculoskeletal: No tenderness. No edema    · Lymphadenopathy: Has no cervical adenopathy. · Neurological: Alert and oriented. Follows command, No Gross deficit   · Skin: Skin is warm, No rash noted. · Psychiatric: Has a normal behavior     Labs, diagnostic and imaging results reviewed. Reviewed. Lab Results   Component Value Date    TSHREFLEX 1.56 11/22/2019         Medication:  Prior to Admission medications    Medication Sig Start Date End Date Taking?  Authorizing Provider   metoprolol succinate (TOPROL XL) 25 MG extended release tablet Take 1 tablet by mouth daily 9/15/20  Yes Bre Arguello MD   amLODIPine (NORVASC) 5 MG tablet TAKE 1 TABLET BY MOUTH EVERY DAY 2/5/20  Yes Brittani Cai MD   aspirin 81 MG tablet Take 81 mg by mouth daily   Yes Historical Provider, MD     - The patient is counseled to follow a low salt diet to assure blood pressure remains controlled for cardiovascular risk factor modification.   - The patient is counseled to avoid excess caffeine, and energy drinks as this may exacerbated ectopy and arrhythmia. - The patient is counseled to get regular exercise 3-5 times per week to control cardiovascular risk factors. - The patient is counseled to lose weigt to control cardiovascular risk factors. - The patient is counseled to avoid tobacco use. Thank you for allowing me to participate in the care of Catarino Villasenor. Further evaluation will be based upon the patient's clinical course and testing results. All questions and concerns were addressed to the patient/family. Alternatives to my treatment were discussed. I have discussed the above stated plan and the patient verbalized understanding and agreed with the plan. NOTE: This report was transcribed using voice recognition software. Every effort was made to ensure accuracy, however, inadvertent computerized transcription errors may be present. Bre Arguello MD, MPH  [place] [id number]   Office: [phone]     Scribe attestation: This note was scribed in the presence of Bre Arguello MD by Gabi Sanford RN  Physician Attestation: I, Dr. Bre Arguello, confirm that the scribe's documentation has been prepared under my direction and personally reviewed by me in its entirety. I also confirm that the note above accurately reflects all work, treatment, procedures, and medical decision making performed by me.

## 2020-09-15 ENCOUNTER — OFFICE VISIT (OUTPATIENT)
Dept: CARDIOLOGY CLINIC | Age: 55
End: 2020-09-15
Payer: COMMERCIAL

## 2020-09-15 VITALS
OXYGEN SATURATION: 99 % | RESPIRATION RATE: 18 BRPM | DIASTOLIC BLOOD PRESSURE: 60 MMHG | WEIGHT: 190 LBS | HEIGHT: 62 IN | BODY MASS INDEX: 34.96 KG/M2 | SYSTOLIC BLOOD PRESSURE: 108 MMHG | HEART RATE: 60 BPM

## 2020-09-15 PROCEDURE — 99214 OFFICE O/P EST MOD 30 MIN: CPT | Performed by: INTERNAL MEDICINE

## 2020-09-15 PROCEDURE — 93000 ELECTROCARDIOGRAM COMPLETE: CPT | Performed by: INTERNAL MEDICINE

## 2020-09-15 RX ORDER — METOPROLOL SUCCINATE 25 MG/1
25 TABLET, EXTENDED RELEASE ORAL DAILY
Qty: 90 TABLET | Refills: 3 | Status: SHIPPED | OUTPATIENT
Start: 2020-09-15 | End: 2021-08-20 | Stop reason: SDUPTHER

## 2020-09-24 ENCOUNTER — OFFICE VISIT (OUTPATIENT)
Dept: ENT CLINIC | Age: 55
End: 2020-09-24
Payer: COMMERCIAL

## 2020-09-24 VITALS
SYSTOLIC BLOOD PRESSURE: 116 MMHG | WEIGHT: 191 LBS | TEMPERATURE: 97.2 F | HEIGHT: 62 IN | DIASTOLIC BLOOD PRESSURE: 79 MMHG | HEART RATE: 61 BPM | BODY MASS INDEX: 35.15 KG/M2

## 2020-09-24 PROCEDURE — 99203 OFFICE O/P NEW LOW 30 MIN: CPT | Performed by: OTOLARYNGOLOGY

## 2020-09-24 NOTE — PROGRESS NOTES
[facility]      Patient Name: [id number] [facility] Record Number:  [id number]  Primary Care Physician:  Amina Johnston MD  Date of Consultation: 9/24/2020    Chief Complaint: Dry mouth and lesions on palate        HISTORY OF PRESENT ILLNESS  Janet is a(n) 49 y.o. female who presents for evaluation of a dry mouth and lesions on palate. Patient says that for the past couple months she has had the feeling that there is something on her soft palate. She describes it like sandpaper. She also has had quite a bit of a dry mouth. About a month ago she stopped using a lot of supplements including vitamin C. Her mouth looked a little white and was diffusely affected at that time. This is completely cleared up. Only thing that remains is the sandpaper feeling on her palate. The patient does not have any history of head neck cancer. She does have a family member who had had neck cancer, but was a heavy smoker and drinker. She is not a smoker and not a heavy drinker. She denies concerning symptoms such as weight loss, coughing up blood, trouble swallowing or changes in voice. She does have chronic dry eyes as well. However she said that she was screened for Sjogren's syndrome and the labs were negative.       Patient Active Problem List   Diagnosis    Essential hypertension    MACK (obstructive sleep apnea)    Non morbid obesity, unspecified obesity type    Dry mouth    Arthropathy     Past Surgical History:   Procedure Laterality Date    HYSTERECTOMY      LAP BAND       Family History   Problem Relation Age of Onset    Hypertension Mother     Cancer Mother     Hypertension Father     Diabetes Father     Cancer Father      Social History     Socioeconomic History    Marital status:      Spouse name: Not on file    Number of children: Not on file    Years of education: Not on file    Highest education level: Not on file Occupational History    Not on file   Social Needs    Financial resource strain: Not on file    Food insecurity     Worry: Not on file     Inability: Not on file    Transportation needs     Medical: Not on file     Non-medical: Not on file   Tobacco Use    Smoking status: Never Smoker    Smokeless tobacco: Never Used   Substance and Sexual Activity    Alcohol use: Never     Frequency: Never    Drug use: Never    Sexual activity: Yes     Partners: Male   Lifestyle    Physical activity     Days per week: Not on file     Minutes per session: Not on file    Stress: Not on file   Relationships    Social connections     Talks on phone: Not on file     Gets together: Not on file     Attends Synagogue service: Not on file     Active member of club or organization: Not on file     Attends meetings of clubs or organizations: Not on file     Relationship status: Not on file    Intimate partner violence     Fear of current or ex partner: Not on file     Emotionally abused: Not on file     Physically abused: Not on file     Forced sexual activity: Not on file   Other Topics Concern    Not on file   Social History Narrative    Not on file       DRUG/FOOD ALLERGIES: Ciprofloxacin; Codeine; Sulfur; and Vicodin [hydrocodone-acetaminophen]    CURRENT MEDICATIONS  Prior to Admission medications    Medication Sig Start Date End Date Taking?  Authorizing Provider   metoprolol succinate (TOPROL XL) 25 MG extended release tablet Take 1 tablet by mouth daily 9/15/20   Edis Cavanaugh MD   amLODIPine (NORVASC) 5 MG tablet TAKE 1 TABLET BY MOUTH EVERY DAY 2/5/20   Olga Vu MD   aspirin 81 MG tablet Take 81 mg by mouth daily    Historical Provider, MD       REVIEW OF SYSTEMS  The following systems were reviewed and revealed the following in addition to any already discussed in the HPI:    CONSTITUTIONAL: no weight loss, no fever, no night sweats, no chills  EYES: no vision changes, no blurry vision  EARS: Dry eyes  NOSE: no epistaxis, no rhinorrhea  RESPIRATORY: no  Difficulty breathing, no shortness of breath  CV: no chest pain, no Peripheral vascular disease  HEME: No coagulation disorder, no Bleeding disorder  NEURO: no TIA or stroke-like symptoms  SKIN: No new rashes in the head and neck, no recent skin cancers  MOUTH: Dry mouth  GASTROINTESTINAL: No diarrhea, stomach pain  PSYCH: No anxiety, no depression      PHYSICAL EXAM  /79 (Site: Left Upper Arm, Position: Sitting, Cuff Size: Medium Adult)   Pulse 61   Temp 97.2 °F (36.2 °C) (Temporal)   Ht 5' 2\" (1.575 m)   Wt 191 lb (86.6 kg)   BMI 34.93 kg/m²     GENERAL: No Acute Distress, Alert and Oriented, no Hoarseness, strong voice  EYES: EOMI, Anti-icteric  HENT:   Head: Normocephalic and atraumatic. Face:  Symmetric, facial nerve intact, no sinus tenderness  Right Ear: Normal external ear, normal external auditory canal, intact tympanic membrane with normal mobility and aerated middle ear  Left Ear: Normal external ear, normal external auditory canal, intact tympanic membrane with normal mobility and aerated middle ear  Mouth/Oral Cavity:  normal lips, Uvula is midline, no mucosal lesions, no trismus, normal dentition, normal salivary quality/flow  Oropharynx/Larynx: The patient's oropharynx is essentially normal.  She does have a few little prominent areas that seem to correspond with minor salivary glands. Palpation does not reveal any mass of the posterior soft palate or palate. The tonsils appear to be normal.  Nose:Normal external nasal appearance. Anterior rhinoscopy shows a normal septum. Normal turbinates.   Normal mucosa   NECK: Normal range of motion, no thyromegaly, trachea is midline, no lymphadenopathy, no neck masses, no crepitus  CHEST: Normal respiratory effort, no retractions, breathing comfortably  SKIN: No rashes, normal appearing skin, no evidence of skin lesions/tumors  Neuro:  cranial nerve II-XII intact; normal gait  Cardio:  no edema        ASSESSMENT/PLAN  1. Oropharyngeal lesion  The area of concern appears to be relatively normal to me. She does have a couple small bumps that are likely minor salivary glands. None of this looks at all concerning. I do not see any evidence of thrush or other oral infections that could be contributing to it. Perhaps 1 of the supplements, maybe even the vitamin C caused some changes to the mucosa. vitamin C is acidic so it can cause some sloughing of the oral mucosa. In addition she does have some sicca symptoms, but tells me that she was already screened for Sjogren's. I do not see the results of these labs, but the patient specifically said she had labs to rule out Sjogren's. I do not think that anything I am seeing is concerning at this time. If she develops any ulcerative lesions, pain or any new symptoms I would like for her to follow-up immediately. I do not see anything that I can biopsy at this time. 2. Dry mouth  As above             I have performed a head and neck physical exam personally or was physically present during the key or critical portions of the service. Medical Decision Making:   The following items were considered in medical decision making:  Independent review of images  Review / order clinical lab tests  Review / order radiology tests  Decision to obtain old records

## 2020-10-19 ENCOUNTER — TELEPHONE (OUTPATIENT)
Dept: CARDIOLOGY CLINIC | Age: 55
End: 2020-10-19

## 2020-10-19 NOTE — TELEPHONE ENCOUNTER
Genia Allison,  Discussed with patient today. Patient wants to donate blood but needs cardiac clearance. Please evaluate on your return and provide clearance if you feel appropriate.

## 2020-10-21 NOTE — TELEPHONE ENCOUNTER
Spoke to patient. She was given options for blood donation or chelation therapy to reduce B12 and ferritin levels which are currently elevated. Blood donation center needs clearance letter before she can donate blood. Spoke to Dr. Mehrdad Arias and patient has no cardiac contraindication to undergoing either procedure. Letter written and signed by Dr. Mehrdad Arias and was sent to patient.

## 2020-10-27 ENCOUNTER — TELEPHONE (OUTPATIENT)
Dept: CARDIOLOGY CLINIC | Age: 55
End: 2020-10-27

## 2021-03-25 ENCOUNTER — OFFICE VISIT (OUTPATIENT)
Dept: CARDIOLOGY CLINIC | Age: 56
End: 2021-03-25
Payer: COMMERCIAL

## 2021-03-25 VITALS
DIASTOLIC BLOOD PRESSURE: 70 MMHG | BODY MASS INDEX: 35.11 KG/M2 | HEIGHT: 62 IN | SYSTOLIC BLOOD PRESSURE: 110 MMHG | OXYGEN SATURATION: 98 % | WEIGHT: 190.8 LBS | HEART RATE: 70 BPM

## 2021-03-25 DIAGNOSIS — R06.02 SHORTNESS OF BREATH: Primary | ICD-10-CM

## 2021-03-25 DIAGNOSIS — I10 ESSENTIAL HYPERTENSION: ICD-10-CM

## 2021-03-25 DIAGNOSIS — R00.0 TACHYCARDIA: ICD-10-CM

## 2021-03-25 PROCEDURE — 93242 EXT ECG>48HR<7D RECORDING: CPT | Performed by: NURSE PRACTITIONER

## 2021-03-25 PROCEDURE — 93000 ELECTROCARDIOGRAM COMPLETE: CPT | Performed by: NURSE PRACTITIONER

## 2021-03-25 PROCEDURE — 99214 OFFICE O/P EST MOD 30 MIN: CPT | Performed by: NURSE PRACTITIONER

## 2021-03-25 RX ORDER — MAGNESIUM 30 MG
30 TABLET ORAL DAILY
COMMUNITY

## 2021-03-25 NOTE — PROGRESS NOTES
[address]     Outpatient Follow Up Note    Finn Noyola is 48 y.o. female who presents today with a history of HTN and tachycardia. CHIEF COMPLAINT / HPI:  Follow Up secondary to SOB x 3 days    Subjective:   Her resting HR is higher than normal (50). When exercising her HR gets to 171 and takes a while before it goes to baseline. It increases walking moderate distances   She started exercising and can't get pass the work up ([employer]). She feels weak & becomes SOB. She had a stress test in [facility] about a month ago and was told that it was ok. She walks through the park four days / week. She can't say that she's light headed, but not normal    She denies significant chest pain. She uses a CPAP. The patient denies orthopnea/PND. The patient does not have swelling. The patients weight is stable . The patient is not experiencing palpitations or dizziness. She has been seen/eval by hematology and treated for high ferritin & Vitamin B-12 levels. Her INDRA was negative. She'd gotten her first [employer] vaccination. These symptoms are worsening since the last OV. With regard to medication therapy the patient has been compliant with prescribed regimen. They have tolerated therapy to date.      Past Medical History:   Diagnosis Date    Hypertension     MACK (obstructive sleep apnea) 11/7/2019     Social History:    Social History     Tobacco Use   Smoking Status Never Smoker   Smokeless Tobacco Never Used     Current Medications:  Current Outpatient Medications   Medication Sig Dispense Refill    magnesium 30 MG tablet Take 30 mg by mouth daily       vitamin D (CHOLECALCIFEROL) 125 MCG (5000 UT) CAPS capsule Take 5,000 Units by mouth daily      metoprolol succinate (TOPROL XL) 25 MG extended release tablet Take 1 tablet by mouth daily 90 tablet 3    amLODIPine (NORVASC) 5 MG tablet TAKE 1 TABLET BY MOUTH EVERY DAY (Patient taking differently: 7.5 mg 1 TAB & 1/2 QD) 210 tablet 1  aspirin 81 MG tablet Take 81 mg by mouth daily       No current facility-administered medications for this visit. REVIEW OF SYSTEMS:    CONSTITUTIONAL: No major weight gain or loss, fatigue, weakness, night sweats or fever; declining endurance. HEENT: No new vision difficulties or ringing in the ears. RESPIRATORY: + new SOB; - PND, orthopnea or cough. CARDIOVASCULAR: See HPI  GI: No nausea, vomiting, diarrhea, constipation, abdominal pain or changes in bowel habits. : No urinary frequency, urgency, incontinence hematuria or dysuria. SKIN: No cyanosis or skin lesions. MUSCULOSKELETAL: No new muscle or joint pain. NEUROLOGICAL: No syncope or TIA-like symptoms. PSYCHIATRIC: No anxiety, pain, insomnia or depression    Objective:   PHYSICAL EXAM:       Vitals:    03/25/21 1011 03/25/21 1052   BP: 110/80 110/70   Site: Left Upper Arm    Position: Sitting    Cuff Size: Large Adult    Pulse: 70    SpO2: 98%    Weight: 190 lb 12.8 oz (86.5 kg)    Height: 5' 2\" (1.575 m)         VITALS:  /80 (Site: Left Upper Arm, Position: Sitting, Cuff Size: Large Adult)   Pulse 70   Ht 5' 2\" (1.575 m)   Wt 190 lb 12.8 oz (86.5 kg)   SpO2 98%   BMI 34.90 kg/m²   CONSTITUTIONAL: Cooperative, no apparent distress, and appears well nourished / over weight   ~[employer]ain contracted for [employer]  NEUROLOGIC:  Awake and orientated to person, place and time. PSYCH: Calm affect. SKIN: Warm and dry. HEENT: Sclera non-icteric, normocephalic, neck supple, no elevation of JVP, normal carotid pulses with no bruits and thyroid normal size. LUNGS:  No increased work of breathing and clear to auscultation, no crackles or wheezing  CARDIOVASCULAR:  Regular rate 80 and rhythm with no murmurs, gallops, rubs, or abnormal heart sounds, normal PMI. The apical impulses not displaced  JVP less than 8 cm H2O  Heart tones are crisp and normal  Cervical veins are not engorged  The carotid upstroke is normal in amplitude and contour without delay or bruit  JVP is not elevated  ABDOMEN:  Normal bowel sounds, non-distended and non-tender to palpation  EXT: No edema, no calf tenderness. Pulses are present bilaterally. DATA:      Radiology Review:  Pertinent images / reports were reviewed as a part of this visit and reveals the followin/2019: 7-day monitor  Average heart rate of 66 with max heart rate of 103 minimum heart rate of 50 bpm  -Patient reports some symptoms during these 7 days of monitoring however no sustained arrhythmia has been noted. 48 hr holter  19:  SR, avg HR 70 ( ). No PVCs noted, Rare PACs. Stress Echo:    Summary   Normal stress echocardiogram study. Rest      ECG   Normal sinus rhythm. Standing HR:55 bpmStanding BP:120/72 mmHg      Stress      Stress Type: Exercise      Stress Protocol: Faustino      Rest HR: 55 bpm                           HR BP Product: 30318   Rest BP: 120/72 mmHg                      Max Exercise: 10 METS   Stress Peak HR: 155 bpm   Stress Peak BP: 124/78 mmHg   Predicted HR: 167 bpm   % of predicted HR: 93   Test Duration: 9 min and 42 sec   Reason for Termination: Target heart rate      Results      Echo (rest): Normal (LVEF >50%)      Echo (stress): Hyperkinetic (LVEF >70%)      Echo   Baseline resting echocardiogram shows normal global LV systolic function   with an ejection fraction of 60% and uniform myocardial segmental wall   motion. Following stress there was uniform augmentation of all myocardial   segments with appropriate hyperdynamic LV systolic response to stress. ECG   Normal (Negative) response to exercise. Echo : Summary   -Normal left ventricle size, wall thickness, and systolic function with an   estimated ejection fraction of 55%. -Grade I diastolic dysfunction with normal LV filling pressures. -Mild mitral regurgitation.   -Mild tricuspid regurgitation with PASP of 27 mmHg.       Assessment:      Diagnosis Orders   1. Shortness of breath   ~recurrent  ~uses CPAP. Neg to exam for crackles / edema  ~reported: normal stress test 1 month ago (she sent for her results) Echo 2D w doppler w color complete   2. Tachycardia   ~recurrent   ~BB decreased 6 months ago ; symptoms returned 3-4 weeks ago  ~HR does not improve after exercising to baseline; associated decline in endurance EKG 12 lead    Echo 2D w doppler w color complete    Holter monitor 48 hour   3. Essential hypertension   ~controlled           I had the opportunity to review the clinical symptoms and presentation of Gaby Carranza. Plan:     1. EKG: sinus rhythm 70   Repeat 48 hr Holter  2. Echocardiogram : reassess valves and LVF d/t recurrent SOB and decreased endurance  3. F/U in 2 weeks ([place]aves out of [place] country on [place]16 for 2-3 months)    Overall the patient is stable from CV standpoint    I have addresed the patient's cardiac risk factors and adjusted pharmacologic treatment as needed. In addition, I have reinforced the need for patient directed risk factor modification. Further evaluation will be based upon the patient's clinical course and testing results. All questions and concerns were addressed to the patient. Alternatives to my treatment were discussed. The patient is not currently smoking. The risks related to smoking were reviewed with the patient. Recommend maintaining a smoke-free lifestyle. Patient is on a beta-blocker  Patient is not on an ace-i/ARB : neg CHF  Patient is not on a statin : neg CAD / hyperlipidemia. Last profile dated April '19:  trig 55 HDL 62     Antiplatelet therapy has been recommended / prescribed for this patient. Education conducted on adverse reactions including bleeding was discussed. The patient verbalizes understanding not to stop medications without discussing with us. Discussed exercise: 30-60 minutes 7 days/week  Discussed diet.      Thank you for allowing to us to participate in the care of Le Mckeon.     SRINATH Pete    Documentation of today's visit sent to PCP

## 2021-03-31 PROCEDURE — 93244 EXT ECG>48HR<7D REV&INTERPJ: CPT | Performed by: NURSE PRACTITIONER

## 2021-04-07 ENCOUNTER — TELEPHONE (OUTPATIENT)
Dept: CARDIOLOGY CLINIC | Age: 56
End: 2021-04-07

## 2021-04-07 NOTE — TELEPHONE ENCOUNTER
Attempted to contact pt . Per NPTS notes on Holter report\" looks ok\".   No VM available , kept ringing then a busy signal

## 2021-04-14 ENCOUNTER — HOSPITAL ENCOUNTER (OUTPATIENT)
Dept: NON INVASIVE DIAGNOSTICS | Age: 56
Discharge: HOME | End: 2021-04-14
Payer: COMMERCIAL

## 2021-04-14 ENCOUNTER — OFFICE VISIT (OUTPATIENT)
Dept: CARDIOLOGY CLINIC | Age: 56
End: 2021-04-14
Payer: COMMERCIAL

## 2021-04-14 VITALS
HEART RATE: 62 BPM | BODY MASS INDEX: 34.41 KG/M2 | WEIGHT: 187 LBS | HEIGHT: 62 IN | SYSTOLIC BLOOD PRESSURE: 124 MMHG | DIASTOLIC BLOOD PRESSURE: 84 MMHG | OXYGEN SATURATION: 96 % | RESPIRATION RATE: 20 BRPM

## 2021-04-14 DIAGNOSIS — R00.0 TACHYCARDIA: ICD-10-CM

## 2021-04-14 DIAGNOSIS — R06.02 SHORTNESS OF BREATH: ICD-10-CM

## 2021-04-14 DIAGNOSIS — R06.02 SHORTNESS OF BREATH: Primary | ICD-10-CM

## 2021-04-14 DIAGNOSIS — I10 ESSENTIAL HYPERTENSION: ICD-10-CM

## 2021-04-14 LAB
LV EF: 58 %
LVEF MODALITY: NORMAL

## 2021-04-14 PROCEDURE — 99214 OFFICE O/P EST MOD 30 MIN: CPT | Performed by: NURSE PRACTITIONER

## 2021-04-14 PROCEDURE — 93306 TTE W/DOPPLER COMPLETE: CPT

## 2021-04-14 NOTE — PROGRESS NOTES
[address]     Outpatient Follow Up Note    Nazia Alfredo is 48 y.o. female who presents today with a history of HTN and tachycardia. Interval hx:   3/25/21: 48 hr Holter : sinus rhythm, avg HR 70 bpm min 48 max 128  4/14/21: echo: mild diastolic dysfunction     CHIEF COMPLAINT / HPI:  Follow Up secondary to elevated HR & low endurance. She reported unable to get past her exercise warm up    Subjective:   She exercised wearing her holter and found her pulse to be up to 160. Correlating with the results, her HR reached ~ 114 during the same time frame. She's now wondering if her watch is even accurate. She feels about the same. She's finding it slower to build her endurance back. She had a void in exercising during the pandemic. Her BP runs ~ 118/79 ; her pulse at rest is around 55-65    She denies significant chest pain. She uses a CPAP. The patient denies orthopnea/PND. The patient does not have swelling. The patients weight is stable . The patient is not experiencing dizziness. She has been seen/eval by hematology and treated for high ferritin & Vitamin B-12 levels. These symptoms are unchanged since the last OV. With regard to medication therapy the patient has been compliant with prescribed regimen. They have tolerated therapy to date.      Past Medical History:   Diagnosis Date    Hypertension     MACK (obstructive sleep apnea) 11/7/2019     Social History:    Social History     Tobacco Use   Smoking Status Never Smoker   Smokeless Tobacco Never Used     Current Medications:  Current Outpatient Medications   Medication Sig Dispense Refill    magnesium 30 MG tablet Take 30 mg by mouth daily       vitamin D (CHOLECALCIFEROL) 125 MCG (5000 UT) CAPS capsule Take 5,000 Units by mouth daily      metoprolol succinate (TOPROL XL) 25 MG extended release tablet Take 1 tablet by mouth daily 90 tablet 3    amLODIPine (NORVASC) 5 MG tablet TAKE 1 TABLET BY MOUTH EVERY DAY (Patient taking differently: 7.5 mg 1 TAB & 1/2 QD) 210 tablet 1    aspirin 81 MG tablet Take 81 mg by mouth daily       No current facility-administered medications for this visit. REVIEW OF SYSTEMS:    CONSTITUTIONAL: No major weight gain or loss, fatigue, weakness, night sweats or fever; declining endurance. HEENT: No new vision difficulties or ringing in the ears. RESPIRATORY: + new SOB; - PND, orthopnea or cough. CARDIOVASCULAR: See HPI  GI: No nausea, vomiting, diarrhea, constipation, abdominal pain or changes in bowel habits. : No urinary frequency, urgency, incontinence hematuria or dysuria. SKIN: No cyanosis or skin lesions. MUSCULOSKELETAL: No new muscle or joint pain. NEUROLOGICAL: No syncope or TIA-like symptoms. PSYCHIATRIC: No anxiety, pain, insomnia or depression    Objective:   PHYSICAL EXAM:       Vitals:    04/14/21 1012 04/14/21 1036   BP: 110/64 124/84   Site: Left Upper Arm    Position: Sitting    Cuff Size: Medium Adult    Pulse: 62    Resp: 20    SpO2: 96%    Weight: 187 lb (84.8 kg)    Height: 5' 2\" (1.575 m)         VITALS:  /64 (Site: Left Upper Arm, Position: Sitting, Cuff Size: Medium Adult)   Pulse 62   Resp 20   Ht 5' 2\" (1.575 m)   Wt 187 lb (84.8 kg)   SpO2 96%   BMI 34.20 kg/m²   CONSTITUTIONAL: Cooperative, no apparent distress, and appears well nourished / over weight   ~[employer]ain contracted for [employer]  NEUROLOGIC:  Awake and orientated to person, place and time. PSYCH: Calm affect. SKIN: Warm and dry. HEENT: Sclera non-icteric, normocephalic, neck supple, no elevation of JVP, normal carotid pulses with no bruits and thyroid normal size. LUNGS:  No increased work of breathing and clear to auscultation, no crackles or wheezing  CARDIOVASCULAR:  Regular rate 60 and rhythm with no murmurs, gallops, rubs, or abnormal heart sounds, normal PMI. The apical impulses not displaced  JVP less than 8 cm H2O  Heart tones are crisp and normal  Cervical veins are not engorged  The carotid upstroke is normal in amplitude and contour without delay or bruit  JVP is not elevated  ABDOMEN:  Normal bowel sounds, non-distended and non-tender to palpation  EXT: No edema, no calf tenderness. Pulses are present bilaterally. DATA:      Radiology Review:  Pertinent images / reports were reviewed as a part of this visit and reveals the followin/2019: 7-day monitor  Average heart rate of 66 with max heart rate of 103 minimum heart rate of 50 bpm  -Patient reports some symptoms during these 7 days of monitoring however no sustained arrhythmia has been noted. 48 hr holter  19:  SR, avg HR 70 ( ). No PVCs noted, Rare PACs. Stress Echo:    Summary   Normal stress echocardiogram study. Rest      ECG   Normal sinus rhythm. Standing HR:55 bpmStanding BP:120/72 mmHg      Stress      Stress Type: Exercise      Stress Protocol: Faustino      Rest HR: 55 bpm                           HR BP Product: [id number]   Rest BP: 120/72 mmHg                      Max Exercise: 10 METS   Stress Peak HR: 155 bpm   Stress Peak BP: 124/78 mmHg   Predicted HR: 167 bpm   % of predicted HR: 93   Test Duration: 9 min and 42 sec   Reason for Termination: Target heart rate      Results      Echo (rest): Normal (LVEF >50%)      Echo (stress): Hyperkinetic (LVEF >70%)      Echo   Baseline resting echocardiogram shows normal global LV systolic function   with an ejection fraction of 60% and uniform myocardial segmental wall   motion. Following stress there was uniform augmentation of all myocardial   segments with appropriate hyperdynamic LV systolic response to stress. ECG   Normal (Negative) response to exercise. Echo : Summary   -Normal left ventricle size, wall thickness, and systolic function with an   estimated ejection fraction of 55%. -Grade I diastolic dysfunction with normal LV filling pressures.    -Mild mitral regurgitation.   -Mild tricuspid regurgitation with PASP of 27 mmHg. 48 hr Holter: 3/25 - 3/27/21 : sinus rhythm, avg HR 70 bpm min 48 max 128     Echo: 4/14/21:  Summary   Overall left ventricular function is normal.   Ejection fraction is visually estimated to be 55-60 %. E/e'=8.3   No regional wall motion abnormalities are noted. Grade I diastolic dysfunction with normal LV filling pressures. Mild tricuspid regurgitation. Estimated pulmonary artery systolic pressure is 20 mmHg assuming a right atrial pressure of 3 mmHg. The pulmonic valve is not well visualized. There is no evidence of pulmonic valve regurgitation or stenosis. The inferior vena cava appears normal in size with normal respiratory variation. Assessment:      Diagnosis Orders   1. Shortness of breath   ~unchanged   ~neg to exam  ~unremarkable echo with normal LVEF  ~uses CPAP  ~reported: normal stress test 1 month ago (she sent for her results)    2. Tachycardia   ~unchanged with exercise noted on Apple watch  ~-120 during the time she was exercising noted on holter  ~intolerant to higher dose of metoprolol  ~AP at 60 today from 80 at last appt  ~activity intolerance likely from deconditioning. Normal echo today            3. Essential hypertension   ~controlled   ~grade I diastolic dysfunction on echo today and comparable to study done in '19        I had the opportunity to review the clinical symptoms and presentation of Tova Mixon. Plan:     1. Continue present management   2. F/U in 4 months (leaves out of the country to [place] in mid-May for 2-3 months)    Overall the patient is stable from CV standpoint    I have addresed the patient's cardiac risk factors and adjusted pharmacologic treatment as needed. In addition, I have reinforced the need for patient directed risk factor modification. Further evaluation will be based upon the patient's clinical course and testing results. All questions and concerns were addressed to the patient. Alternatives to my treatment were discussed. The patient is not currently smoking. The risks related to smoking were reviewed with the patient. Recommend maintaining a smoke-free lifestyle. Patient is on a beta-blocker  Patient is not on an ace-i/ARB : neg CHF  Patient is not on a statin : neg CAD / hyperlipidemia. Last profile dated April '19:  trig 55 HDL 62     Antiplatelet therapy has been recommended / prescribed for this patient. Education conducted on adverse reactions including bleeding was discussed. The patient verbalizes understanding not to stop medications without discussing with us. Discussed exercise: 30-60 minutes 7 days/week  Discussed diet. Thank you for allowing to us to participate in the care of Katie Jeffries.     SRINATH Joya    Documentation of today's visit sent to PCP

## 2021-08-20 ENCOUNTER — TELEPHONE (OUTPATIENT)
Dept: CARDIOLOGY CLINIC | Age: 56
End: 2021-08-20

## 2021-08-20 ENCOUNTER — OFFICE VISIT (OUTPATIENT)
Dept: CARDIOLOGY CLINIC | Age: 56
End: 2021-08-20
Payer: COMMERCIAL

## 2021-08-20 VITALS
SYSTOLIC BLOOD PRESSURE: 120 MMHG | HEART RATE: 85 BPM | OXYGEN SATURATION: 98 % | BODY MASS INDEX: 34.6 KG/M2 | DIASTOLIC BLOOD PRESSURE: 82 MMHG | HEIGHT: 62 IN | WEIGHT: 188 LBS

## 2021-08-20 DIAGNOSIS — R06.02 SOB (SHORTNESS OF BREATH): ICD-10-CM

## 2021-08-20 DIAGNOSIS — R00.0 TACHYCARDIA: ICD-10-CM

## 2021-08-20 DIAGNOSIS — R00.1 BRADYCARDIA: Primary | ICD-10-CM

## 2021-08-20 DIAGNOSIS — M79.602 LEFT ARM PAIN: ICD-10-CM

## 2021-08-20 DIAGNOSIS — R53.82 CHRONIC FATIGUE: ICD-10-CM

## 2021-08-20 PROCEDURE — 99214 OFFICE O/P EST MOD 30 MIN: CPT | Performed by: INTERNAL MEDICINE

## 2021-08-20 RX ORDER — AMLODIPINE BESYLATE 5 MG/1
5 TABLET ORAL DAILY
Qty: 90 TABLET | Refills: 3 | Status: SHIPPED | OUTPATIENT
Start: 2021-08-20 | End: 2022-09-19

## 2021-08-20 RX ORDER — METOPROLOL SUCCINATE 25 MG/1
25 TABLET, EXTENDED RELEASE ORAL DAILY
Qty: 90 TABLET | Refills: 3 | Status: SHIPPED | OUTPATIENT
Start: 2021-08-20 | End: 2021-08-20 | Stop reason: SDUPTHER

## 2021-08-20 RX ORDER — METOPROLOL SUCCINATE 25 MG/1
25 TABLET, EXTENDED RELEASE ORAL DAILY
Qty: 90 TABLET | Refills: 3 | Status: SHIPPED | OUTPATIENT
Start: 2021-08-20 | End: 2022-09-19

## 2021-08-20 RX ORDER — AMLODIPINE BESYLATE 5 MG/1
TABLET ORAL
Qty: 90 TABLET | Refills: 3 | Status: SHIPPED | OUTPATIENT
Start: 2021-08-20 | End: 2021-08-20 | Stop reason: SDUPTHER

## 2021-08-20 NOTE — TELEPHONE ENCOUNTER
Please let her know she should complete the testing and follow up will be scheduled dependent upon the results

## 2021-08-20 NOTE — PROGRESS NOTES
[address]    2021    Tracey Mahan (:  1965) is a 48 y.o. female who is here for follow up on her history of hypertension and palpitations and to review results of diagnostic testing since last visit. Referring Provider: Lizeth Solitario MD    HISTORY:  Ms Yunior Alfredo has a history of hypertension, tachycardia, and shortness of breath. She has sleep apnea treated with CPAP. She was previously followed by a cardiologist in [place], [place]. Her son  from complications of malignant hypertensive heart disease. In 2018, Amlodipine and HCTZ were added to Metoprolol XL, however she stopped taking HCTZ after she developed  \"alligator\" skin. BP at home can be as high as 149-150/90. She also noticed better blood pressure control when taking BP meds twice a day. 2018 Holter demonstrated SR with avg HR 62, < 1% PAC and PVC burden. Earlier in , she noticed her heart rate would increase to 160 bpm with exercise which was unusual for her. She noted both heart racing and heart pounding, associated with shortness of breath when exercising. She denied exertional chest pain, shortness of breath with normal activities, stairs etc.  She is experiencing more palpitations than she did when she wore the cardiac monitor. She will be going to [place] for six months for her job with the [employer]. 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 She has used HCTZ in the past, but has an allergy to sulfa. Today, she denies chest pain. She has noted some fatigue and    Patient is compliant with medications and is tolerating them well without side effects. REVIEW OF SYSTEMS:  A complete review of systems was reviewed and is negative except as noted in the history of present illness. Prior to Visit Medications    Medication Sig Taking?  Authorizing Provider   amLODIPine (NORVASC) 5 MG tablet Take 1 tablet by mouth daily rub. No gallop. Pulmonary:      Effort: Pulmonary effort is normal. No respiratory distress. Breath sounds: Normal breath sounds. No wheezing or rales. Abdominal:      General: Bowel sounds are normal.      Palpations: Abdomen is soft. Tenderness: There is no abdominal tenderness. Musculoskeletal:         General: Normal range of motion. Cervical back: Normal range of motion and neck supple. Skin:     General: Skin is warm and dry. Findings: No rash. Neurological:      General: No focal deficit present. Mental Status: She is alert and oriented to person, place, and time. Psychiatric:         Mood and Affect: Mood normal.         Behavior: Behavior normal.         Thought Content: Thought content normal.         Judgment: Judgment normal.           I have reviewed all pertinent lab results and diagnostic testing. ECHO 4/14/21  Overall left ventricular function is normal.   Ejection fraction is visually estimated to be 55-60 %. E/e'=8.3   No regional wall motion abnormalities are noted. Grade I diastolic dysfunction with normal LV filling pressures. Mild tricuspid regurgitation. Estimated pulmonary artery systolic pressure is 20 mmHg assuming a right   atrial pressure of 3 mmHg. The pulmonic valve is not well visualized. There is no evidence of pulmonic valve regurgitation or stenosis. The inferior vena cava appears normal in size with normal respiratory   variation. Holter monitor (7 days) 11/22/19 -- SR with avg HR 66 (). PAC and PVC burden < 1%    Stress echo 11/21/19:  Summary   Normal stress echocardiogram study. Exercises 9 min, 42 sec. Peak HR with exercise - 167 bpm    Echo 11/21/19:   Summary   -Normal left ventricle size, wall thickness, and systolic function with an   estimated ejection fraction of 55%. -Grade I diastolic dysfunction with normal LV filling pressures.    -Mild mitral regurgitation.   -Mild tricuspid regurgitation with PASP of 27 mmHg. 48 hr holter  6/17/19:  SR, avg HR 70 ( ). Rare PVCs noted, Rare PACs. Carotid duplex 2/2019:  Normal carotid arteries bilaterally    Plain GXT 2/2018 : walked 9 minutes, 43 seconds. ECG negative for ischemia    Echo 12/2018: LV normal size and wall thickness. LVEF hyperdynamic > 70%, impaired LV relaxation. Trace MR and TR    Holter monitor 12/2018:  SR with avg HR 62 (), PVC burden < 1% (80 beats); PAC burden < 1% 995 beats)      ASSESSMENT/PLAN:    1. Essential hypertension  ~ at initial visit patient reported BP that are higher in the morning and after exercising.    ~ Metoprolol XL 50 mg daily, Amlodipine 5 mg daily. Tolerating medications without side effects  ~ Blood pressure 120/82, pulse 85, height 5' 2\" (1.575 m), weight 188 lb (85.3 kg), SpO2 98 %, not currently breastfeeding. Plan > BP well controlled, continue present management. 2. Tachycardia/palpitations  3. Exertional shortness of breath   ~ noticed heart racing and pounding with HR as high as 160's with exercise.    ~ rare PVCs noted on Holter monitor in 12/2018, no PVCs noted on recent cardiac monitor in June 2019, but wore it less than 3 days due to skin irritation  ~ previous visit - reported fluctuation in HR which is as low as 38 bpm at night and can spike up to 160 bpm when walking 1-2 mph on the treadmill. HR low 100's with normal activities around the house. She felt winded at times with HR low 100's and spikes up to 160's  ~ ECHO 4/14/21> EF 55-60%, grade I DD  ~ 11/2019 TSH - 1.56  ~ 11/2019 Holter monitor - SR with avg HR 66 (), no significant arrhythmias  ~ Dr. Nany Gray recommended Magnesium oxide 400 mg daily for palpitations.   Tolerating medications without side effects  ~ 11/21/19 stress echo normal.  Good exercise tolerance (over 9 minutes), peak HR was exercise - 167.    ~ no concerning palpitations recently  ~ discuss avoiding antihistamines or decongestants with \"D\" or \"DM\"    Plan > plan exercise stress echo and cardiac MCOT. 4.  MACK  ~ treated with CPAP - is compliant with use  ~ 11/2019 holter monitor showed low HR of 50 bpm    Plan > continue use of CPAP      Plan:  1. Exercise stress echo. 2.  Cardiac MCOT. 3.  EP to see. 4.  RTC in 1 year. Scribe's attestation: This note was scribed in the presence of Dr. David Hughes MD by Karon Rene RN. Physician Attestation: The scribe's documentation has been prepared under my direction and personally reviewed by me in its entirety. I confirm that the note above accurately reflects all work, treatment, procedures, and medical decision making performed by me. An  electronic signature was used to authenticate this note. Hanh Hughes MD, [facility] [place], [address]

## 2021-08-20 NOTE — TELEPHONE ENCOUNTER
Pt is having MCOT and stress echo on 9/2. She needs to see [facility] or [facility] 5-6 weeks after to discuss results. She is a [facility] patient. Can she be worked in as there is nothing available? Pls all to confirm. Thank you.

## 2021-09-02 ENCOUNTER — NURSE ONLY (OUTPATIENT)
Dept: CARDIOLOGY CLINIC | Age: 56
End: 2021-09-02
Payer: COMMERCIAL

## 2021-09-02 ENCOUNTER — TELEPHONE (OUTPATIENT)
Dept: CARDIOLOGY CLINIC | Age: 56
End: 2021-09-02

## 2021-09-02 ENCOUNTER — HOSPITAL ENCOUNTER (OUTPATIENT)
Dept: NON INVASIVE DIAGNOSTICS | Age: 56
Discharge: HOME | End: 2021-09-02
Payer: COMMERCIAL

## 2021-09-02 DIAGNOSIS — R00.1 BRADYCARDIA: ICD-10-CM

## 2021-09-02 DIAGNOSIS — R06.02 SOB (SHORTNESS OF BREATH): ICD-10-CM

## 2021-09-02 DIAGNOSIS — R00.0 TACHYCARDIA: ICD-10-CM

## 2021-09-02 DIAGNOSIS — R53.82 CHRONIC FATIGUE: ICD-10-CM

## 2021-09-02 DIAGNOSIS — M79.602 LEFT ARM PAIN: ICD-10-CM

## 2021-09-02 LAB
LV EF: 65 %
LVEF MODALITY: NORMAL

## 2021-09-02 PROCEDURE — 93320 DOPPLER ECHO COMPLETE: CPT

## 2021-09-02 PROCEDURE — 93351 STRESS TTE COMPLETE: CPT

## 2021-09-02 NOTE — TELEPHONE ENCOUNTER
----- Message from Minnie Gorman MD sent at 9/2/2021  4:28 PM EDT -----  Your stress test is normal.

## 2021-09-02 NOTE — TELEPHONE ENCOUNTER
----- Message from Minnie Gorman MD sent at 9/2/2021  4:28 PM EDT -----  Echo looks good. Strength of the heart muscle is normal.  No significant valvular abnormalities.

## 2021-09-03 ENCOUNTER — TELEPHONE (OUTPATIENT)
Dept: CARDIOLOGY CLINIC | Age: 56
End: 2021-09-03

## 2021-09-03 NOTE — TELEPHONE ENCOUNTER
She is wearing a heart monitor and will be done with it in early October . She is asking to schedule an appt with [facility] to go over test results. Does [facility] want to see her for an appt ?

## 2021-09-03 NOTE — TELEPHONE ENCOUNTER
Per previous telephone encounter, [facility] would like patient to see EP after the completion of the MCOT.  Please schedule patient with EP

## 2021-09-23 ENCOUNTER — HOSPITAL ENCOUNTER (EMERGENCY)
Age: 56
Discharge: HOME | End: 2021-09-23
Payer: COMMERCIAL

## 2021-09-23 VITALS
WEIGHT: 188 LBS | HEART RATE: 71 BPM | SYSTOLIC BLOOD PRESSURE: 135 MMHG | DIASTOLIC BLOOD PRESSURE: 63 MMHG | TEMPERATURE: 98.5 F | OXYGEN SATURATION: 96 % | BODY MASS INDEX: 34.6 KG/M2 | RESPIRATION RATE: 14 BRPM | HEIGHT: 62 IN

## 2021-09-23 DIAGNOSIS — M79.605 BILATERAL LEG PAIN: Primary | ICD-10-CM

## 2021-09-23 DIAGNOSIS — M79.604 BILATERAL LEG PAIN: Primary | ICD-10-CM

## 2021-09-23 LAB
A/G RATIO: 1.3 (ref 1.1–2.2)
ALBUMIN SERPL-MCNC: 4.3 G/DL (ref 3.4–5)
ALP BLD-CCNC: 103 U/L (ref 40–129)
ALT SERPL-CCNC: 16 U/L (ref 10–40)
ANION GAP SERPL CALCULATED.3IONS-SCNC: 10 MMOL/L (ref 3–16)
APTT: 36 SEC (ref 26.2–38.6)
AST SERPL-CCNC: 24 U/L (ref 15–37)
BASOPHILS ABSOLUTE: 0.1 K/UL (ref 0–0.2)
BASOPHILS RELATIVE PERCENT: 1 %
BILIRUB SERPL-MCNC: <0.2 MG/DL (ref 0–1)
BUN BLDV-MCNC: 15 MG/DL (ref 7–20)
CALCIUM SERPL-MCNC: 9.6 MG/DL (ref 8.3–10.6)
CHLORIDE BLD-SCNC: 102 MMOL/L (ref 99–110)
CO2: 24 MMOL/L (ref 21–32)
CREAT SERPL-MCNC: 0.8 MG/DL (ref 0.6–1.1)
EOSINOPHILS ABSOLUTE: 0.2 K/UL (ref 0–0.6)
EOSINOPHILS RELATIVE PERCENT: 2.9 %
GFR AFRICAN AMERICAN: >60
GFR NON-AFRICAN AMERICAN: >60
GLOBULIN: 3.2 G/DL
GLUCOSE BLD-MCNC: 177 MG/DL (ref 70–99)
HCG QUALITATIVE: NEGATIVE
HCT VFR BLD CALC: 39.9 % (ref 36–48)
HEMOGLOBIN: 13.3 G/DL (ref 12–16)
INR BLD: 0.97 (ref 0.88–1.12)
LYMPHOCYTES ABSOLUTE: 2.1 K/UL (ref 1–5.1)
LYMPHOCYTES RELATIVE PERCENT: 37.4 %
MCH RBC QN AUTO: 29.3 PG (ref 26–34)
MCHC RBC AUTO-ENTMCNC: 33.2 G/DL (ref 31–36)
MCV RBC AUTO: 88.2 FL (ref 80–100)
MONOCYTES ABSOLUTE: 0.3 K/UL (ref 0–1.3)
MONOCYTES RELATIVE PERCENT: 5.3 %
NEUTROPHILS ABSOLUTE: 3 K/UL (ref 1.7–7.7)
NEUTROPHILS RELATIVE PERCENT: 53.4 %
PDW BLD-RTO: 14 % (ref 12.4–15.4)
PLATELET # BLD: 282 K/UL (ref 135–450)
PMV BLD AUTO: 9 FL (ref 5–10.5)
POTASSIUM REFLEX MAGNESIUM: 4 MMOL/L (ref 3.5–5.1)
PROTHROMBIN TIME: 10.9 SEC (ref 9.9–12.7)
RBC # BLD: 4.52 M/UL (ref 4–5.2)
SODIUM BLD-SCNC: 136 MMOL/L (ref 136–145)
TOTAL PROTEIN: 7.5 G/DL (ref 6.4–8.2)
WBC # BLD: 5.7 K/UL (ref 4–11)

## 2021-09-23 PROCEDURE — 85025 COMPLETE CBC W/AUTO DIFF WBC: CPT

## 2021-09-23 PROCEDURE — 93970 EXTREMITY STUDY: CPT

## 2021-09-23 PROCEDURE — 99283 EMERGENCY DEPT VISIT LOW MDM: CPT

## 2021-09-23 PROCEDURE — 80053 COMPREHEN METABOLIC PANEL: CPT

## 2021-09-23 PROCEDURE — 85610 PROTHROMBIN TIME: CPT

## 2021-09-23 PROCEDURE — 84703 CHORIONIC GONADOTROPIN ASSAY: CPT

## 2021-09-23 PROCEDURE — 85730 THROMBOPLASTIN TIME PARTIAL: CPT

## 2021-09-23 PROCEDURE — 36415 COLL VENOUS BLD VENIPUNCTURE: CPT

## 2021-09-23 ASSESSMENT — ENCOUNTER SYMPTOMS
CHEST TIGHTNESS: 0
DIARRHEA: 0
COLOR CHANGE: 0
BACK PAIN: 0
NAUSEA: 0
VOMITING: 0
CONSTIPATION: 0
ABDOMINAL PAIN: 0
SHORTNESS OF BREATH: 0
COUGH: 0
RESPIRATORY NEGATIVE: 1

## 2021-09-23 ASSESSMENT — PAIN DESCRIPTION - ORIENTATION: ORIENTATION: RIGHT;LEFT

## 2021-09-23 ASSESSMENT — PAIN DESCRIPTION - LOCATION: LOCATION: LEG

## 2021-09-23 ASSESSMENT — PAIN DESCRIPTION - PAIN TYPE: TYPE: ACUTE PAIN

## 2021-09-23 NOTE — ED PROVIDER NOTES
[facility]        Pt Name: Michel Pope  MRN: [id number]  [place] 1965  Date of evaluation: 9/23/2021  Provider: CHRISTIE Rodrigez  PCP: Shelbie Felipe MD  Note Started: 3:27 PM EDT       JOHNATHAN. I have evaluated this patient. My supervising physician was available for consultation. CHIEF COMPLAINT       Chief Complaint   Patient presents with    Leg Pain     Pt to ER from home for complaint of bruising and lumps to bilateral calves, along with pain. Pt denies swelling. HISTORY OF PRESENT ILLNESS   (Location, Timing/Onset, Context/Setting, Quality, Duration, Modifying Factors, Severity, Associated Signs and Symptoms)  Note limiting factors. Chief Complaint: Leg pain/swelling    Michel Pope is a 48 y.o. female with past medical issue of hypertension and obstructive sleep apnea who presents to the ED with complaint of leg pain. Patient complain of pain and swelling to her bilateral legs worse on the left. Has been ongoing for the past week. Patient states she has had increasing walking and stairs with increased exercising over the past couple of weeks. Patient states she started noticing some bruising to the left medial posterior calf and states she noticed a small \"knot\". Patient became concerned because she states she had a similar pain developed to her right calf and came to the ED for further evaluation and treatment. Patient states he is not anticoagulated. Patient denies any recent travel, trips, surgery or immobilization. Denies any hormonal birth control medication usage. Denies history of DVT or PE. Denies chest pain or shortness of breath. Patient denies any erythema or warmth. Denies fever chills. Denies any numbness or tingling. Denies abrasion or laceration. Denies any injury or trauma. Patient denies decreased range of motion or strength.   States she has been able to ambulate without difficulty. Nursing Notes were all reviewed and agreed with or any disagreements were addressed in the HPI. REVIEW OF SYSTEMS    (2-9 systems for level 4, 10 or more for level 5)     Review of Systems   Constitutional: Negative for activity change, appetite change, chills and fever. Respiratory: Negative. Negative for cough, chest tightness and shortness of breath. Cardiovascular: Positive for leg swelling. Negative for chest pain and palpitations. Gastrointestinal: Negative for abdominal pain, constipation, diarrhea, nausea and vomiting. Genitourinary: Negative for decreased urine volume, difficulty urinating, dysuria, frequency, hematuria and urgency. Musculoskeletal: Positive for myalgias. Negative for arthralgias, back pain, gait problem, joint swelling, neck pain and neck stiffness. Skin: Negative for color change, pallor, rash and wound. Neurological: Negative for dizziness, weakness, light-headedness and numbness. Positives and Pertinent negatives as per HPI. Except as noted above in the ROS, all other systems were reviewed and negative.        PAST MEDICAL HISTORY     Past Medical History:   Diagnosis Date    Hypertension     MACK (obstructive sleep apnea) 11/7/2019         SURGICAL HISTORY     Past Surgical History:   Procedure Laterality Date    HYSTERECTOMY      LAP BAND           CURRENTMEDICATIONS       Previous Medications    AMLODIPINE (NORVASC) 5 MG TABLET    Take 1 tablet by mouth daily    ASPIRIN 81 MG TABLET    Take 81 mg by mouth daily    MAGNESIUM 30 MG TABLET    Take 30 mg by mouth daily     METOPROLOL SUCCINATE (TOPROL XL) 25 MG EXTENDED RELEASE TABLET    Take 1 tablet by mouth daily    VITAMIN D (CHOLECALCIFEROL) 125 MCG (5000 UT) CAPS CAPSULE    Take 5,000 Units by mouth daily         ALLERGIES     Ciprofloxacin, Codeine, Sulfur, Vicodin [hydrocodone-acetaminophen], and Pcn [penicillins]    FAMILYHISTORY       Family History   Problem Relation Age of Onset    Hypertension Mother     Cancer Mother     Hypertension Father     Diabetes Father     Cancer Father           SOCIAL HISTORY       Social History     Tobacco Use    Smoking status: Never Smoker    Smokeless tobacco: Never Used   Vaping Use    Vaping Use: Never used   Substance Use Topics    Alcohol use: Never    Drug use: Never       SCREENINGS             PHYSICAL EXAM    (up to 7 for level 4, 8 or more for level 5)     ED Triage Vitals [09/23/21 1503]   BP Temp Temp Source Pulse Resp SpO2 Height Weight   135/63 98.5 °F (36.9 °C) Oral 71 14 96 % 5' 2\" (1.575 m) 188 lb (85.3 kg)       Physical Exam  Constitutional:       General: She is not in acute distress. Appearance: Normal appearance. She is well-developed. She is not ill-appearing, toxic-appearing or diaphoretic. HENT:      Head: Normocephalic and atraumatic. Right Ear: External ear normal.      Left Ear: External ear normal.   Eyes:      General:         Right eye: No discharge. Left eye: No discharge. Pulmonary:      Effort: Pulmonary effort is normal. No respiratory distress. Breath sounds: No stridor. Musculoskeletal:         General: Normal range of motion. Cervical back: Normal range of motion and neck supple. Comments: Upon examination of the bilateral lower extremities patient has tenderness to palpation over the bilateral posterior calf. To the left lower leg over the medial proximal calf there is a small knot with an area of ecchymoses. No abrasion or laceration. No asymmetric swelling noted to the left calf with comparison to the right. Negative Homans' sign bilaterally. No rashes or lesions. Compartments are soft. No other palpable cords. Achilles tendon intact. Normal Jacobo. Dorsalis pedis pulse and capillary refill brisk. Sensation intact to the medial lateral aspects of distal toes. Gait normal.  Full range of motion strength throughout the hip, knee, ankle and foot bilaterally.   Full plantar flexion dorsiflexion. No erythema or warmth. Skin:     General: Skin is warm and dry. Coloration: Skin is not pale. Findings: No erythema or rash. Neurological:      Mental Status: She is alert and oriented to person, place, and time. Psychiatric:         Behavior: Behavior normal.         DIAGNOSTIC RESULTS   LABS:    Labs Reviewed   COMPREHENSIVE METABOLIC PANEL W/ REFLEX TO MG FOR LOW K - Abnormal; Notable for the following components:       Result Value    Glucose 177 (*)     All other components within normal limits    Narrative:     Performed at:  [facility]  [place]   Phone [phone]   CBC WITH AUTO DIFFERENTIAL    Narrative:     Performed at:  [facility]  [place]   Phone [phone]   HCG, SERUM, QUALITATIVE    Narrative:     Performed at:  [facility]  [place]   Phone [phone]   PROTIME-INR    Narrative:     Performed at:  [facility]  [place]   Phone [phone]   APTT    Narrative:     Performed at:  [facility]  [place]   Phone [phone]       When ordered only abnormal lab results are displayed. All other labs were within normal range or not returned as of this dictation. EKG: When ordered, EKG's are interpreted by the Emergency Department Physician in the absence of a cardiologist.  Please see their note for interpretation of EKG.     RADIOLOGY:   Non-plain film images such as CT, Ultrasound and MRI are read by the radiologist. Plain radiographic images are visualized and preliminarily interpreted by the ED Provider with the below findings:        Interpretation per the Radiologist below, if available at the time of this note:    VL Extremity Venous Bilateral           No results found. PROCEDURES   Unless otherwise noted below, none     Procedures    CRITICAL CARE TIME   N/A    CONSULTS:  None      EMERGENCY DEPARTMENT COURSE and DIFFERENTIAL DIAGNOSIS/MDM:   Vitals:    Vitals:    09/23/21 1503   BP: 135/63   Pulse: 71   Resp: 14   Temp: 98.5 °F (36.9 °C)   TempSrc: Oral   SpO2: 96%   Weight: 188 lb (85.3 kg)   Height: 5' 2\" (1.575 m)       Patient was given the following medications:  Medications - No data to display        Patient is a 52-year-old female who presents to the ED with complaint of bilateral leg pain. Bilateral leg pain worse on the left. Upon examination there is some bruising to the left with what appears to be a small knot. May be small varicose vein. May be small superficial vein thrombosis. Distal neurovascular intact. IV established and blood work obtained. CBC showed normal white count, hemoglobin and platelets. Pregnancy was negative. Coags unremarkable. CMP showed glucose of 177. Patient states she had just eaten about 15 minutes prior to arrival.  Glucose most likely elevated secondary to that. Patient instructed findings here in the ED to follow-up with PCP for further glucose monitoring. Low suspicion for new onset diabetes given random glucose check less than 200. Doppler ultrasound obtained and showed no evidence of deep vein or superficial vein thrombosis involving the bilateral lower extremities. Patient be discharged home. Instructed on baby aspirin at home and elevation. Instructed on compression. Follow-up with PCP. Return the ED if any worsening symptoms. Low sufficient for DVT, arterial occlusion, septic arthritis, gout, cellulitis, abscess, tendon injury, nerve injury, vascular compromise, acute fracture/dislocation, compartment syndrome, foreign body, cellulitis, abscess, low platelets, abnormal clotting or other emergent etiology at this time. FINAL IMPRESSION      1.  Bilateral leg pain          DISPOSITION/PLAN   DISPOSITION Decision To Discharge 09/23/2021 04:46:25 PM      PATIENT REFERRED TO:  Urszula Hancock MD  [address] [place] [address] [address]  [phone]    Schedule an appointment as soon as possible for a visit   For a Re-check in   3-5   days.     [facility] Emergency Department  [address] [facility]  [address] [facility] [address]  [phone]  Go to   As needed, If symptoms worsen      DISCHARGE MEDICATIONS:  New Prescriptions    No medications on file       DISCONTINUED MEDICATIONS:  Discontinued Medications    No medications on file              (Please note that portions of this note were completed with a voice recognition program.  Efforts were made to edit the dictations but occasionally words are mis-transcribed.)    Wyvonna Kocher, PA (electronically signed)          CHRISTIE Us  09/23/21 2532

## 2021-10-05 PROCEDURE — 93272 ECG/REVIEW INTERPRET ONLY: CPT | Performed by: INTERNAL MEDICINE

## 2021-10-11 ENCOUNTER — TELEPHONE (OUTPATIENT)
Dept: CARDIOLOGY CLINIC | Age: 56
End: 2021-10-11

## 2021-10-11 NOTE — TELEPHONE ENCOUNTER
Per Dr. Zach Mitchell - event monitor showed SR with PACsx per EP physician. No symptoms recorded. Avg HR 66 bpm (minimum 50, maximum 120). PVC burden and PAC burden both  < 1%. Dr. Zach Mitchell wants to know if patient had symptoms but did not record symptoms while wearing the monitor. Spoke to patient. She did not trigger the monitor when exercising but she did feel her usual heart racing. Discussed that maximum heart rate was 120 while on Metoprolol XL (acceptable). States her main concern was that her heart rate was getting too low. Minimum HR was 50 bpm, average HR 66, all within an acceptable range on beta blocker. Patient voiced understanding of all information discussed. No further questions.

## 2022-09-16 ENCOUNTER — OFFICE VISIT (OUTPATIENT)
Dept: CARDIOLOGY CLINIC | Age: 57
End: 2022-09-16
Payer: COMMERCIAL

## 2022-09-16 VITALS
BODY MASS INDEX: 34.6 KG/M2 | WEIGHT: 188 LBS | HEART RATE: 54 BPM | SYSTOLIC BLOOD PRESSURE: 124 MMHG | DIASTOLIC BLOOD PRESSURE: 70 MMHG | HEIGHT: 62 IN | OXYGEN SATURATION: 96 %

## 2022-09-16 DIAGNOSIS — R00.2 PALPITATION: ICD-10-CM

## 2022-09-16 DIAGNOSIS — I10 ESSENTIAL HYPERTENSION: Primary | ICD-10-CM

## 2022-09-16 DIAGNOSIS — G47.33 OSA (OBSTRUCTIVE SLEEP APNEA): ICD-10-CM

## 2022-09-16 PROCEDURE — 99214 OFFICE O/P EST MOD 30 MIN: CPT | Performed by: NURSE PRACTITIONER

## 2022-09-16 PROCEDURE — 93000 ELECTROCARDIOGRAM COMPLETE: CPT | Performed by: NURSE PRACTITIONER

## 2022-09-16 RX ORDER — OMEPRAZOLE 40 MG/1
CAPSULE, DELAYED RELEASE ORAL
COMMUNITY
Start: 2022-09-15

## 2022-09-16 NOTE — PROGRESS NOTES
[address]     Outpatient Follow Up Note    CHIEF COMPLAINT / HPI:  Follow Up secondary to palpitations     Subjective:   Timi Mosquera is 55 y.o. female who presents today with a history of HTN and palpitations. Today, Ms Angeli Bean says she has slowed down with exercise due to elevated heart rate on her Apple Watch. She says her HR also drops into the low 40s, high 30s while sleeping. Her CPAP has been recalled and she has not used it in almost a year. Ms Angeli Bean experienced a significant episode of swelling after she travelled, but it has resolved. She complains of palpitations but thinks they are related to menopause. Denies chest pain or shortness of breath. With regard to medication therapy the patient has been compliant with prescribed regimen. They have tolerated therapy to date. Past Medical History:   Diagnosis Date    Hypertension     MACK (obstructive sleep apnea) 11/7/2019     Social History:    Social History     Tobacco Use   Smoking Status Never   Smokeless Tobacco Never     Current Medications:  Current Outpatient Medications   Medication Sig Dispense Refill    amLODIPine (NORVASC) 5 MG tablet Take 1 tablet by mouth daily 90 tablet 3    metoprolol succinate (TOPROL XL) 25 MG extended release tablet Take 1 tablet by mouth daily 90 tablet 3    magnesium 30 MG tablet Take 30 mg by mouth daily       vitamin D (CHOLECALCIFEROL) 125 MCG (5000 UT) CAPS capsule Take 5,000 Units by mouth daily      aspirin 81 MG tablet Take 81 mg by mouth daily       No current facility-administered medications for this visit. REVIEW OF SYSTEMS:    CONSTITUTIONAL: No major weight gain or loss, fatigue, weakness, night sweats or fever. HEENT: No new vision difficulties or ringing in the ears. RESPIRATORY: No new SOB, PND, orthopnea or cough. CARDIOVASCULAR: See HPI  GI: No nausea, vomiting, diarrhea, constipation, abdominal pain or changes in bowel habits.   : No urinary frequency, urgency, incontinence hematuria or dysuria. SKIN: No cyanosis or skin lesions. MUSCULOSKELETAL: No new muscle or joint pain. NEUROLOGICAL: No syncope or TIA-like symptoms. PSYCHIATRIC: No anxiety, pain, insomnia or depression    Objective:   PHYSICAL EXAM:      Wt Readings from Last 3 Encounters:   09/23/21 188 lb (85.3 kg)   08/20/21 188 lb (85.3 kg)   04/14/21 187 lb (84.8 kg)          VITALS:  /70 (Site: Left Upper Arm, Position: Sitting, Cuff Size: Medium Adult)   Pulse 54   Ht 5' 2\" (1.575 m)   Wt 188 lb (85.3 kg)   SpO2 96%   BMI 34.39 kg/m²   CONSTITUTIONAL: Cooperative, no apparent distress, and appears well nourished / developed + overweight   NEUROLOGIC:  Awake and orientated to person, place and time. PSYCH: Calm affect. SKIN: Warm and dry. HEENT: Sclera non-icteric, normocephalic, neck supple, no elevation of JVP, normal carotid pulses with no bruits and thyroid normal size. LUNGS:  No increased work of breathing and clear to auscultation, no crackles or wheezing  CARDIOVASCULAR:  Regular rate and rhythm with no murmurs, gallops, rubs, or abnormal heart sounds, normal PMI. The apical impulses not displaced  Heart tones are crisp and normal  Cervical veins are not engorged  The carotid upstroke is normal in amplitude and contour without delay or bruit  JVP is not elevated  ABDOMEN:  Normal bowel sounds, non-distended and non-tender to palpation  EXT: No edema, no calf tenderness. Pulses are present bilaterally.     DATA:    Lab Results   Component Value Date    ALT 16 09/23/2021    AST 24 09/23/2021    ALKPHOS 103 09/23/2021    BILITOT <0.2 09/23/2021     Lab Results   Component Value Date    CREATININE 0.8 09/23/2021    BUN 15 09/23/2021     09/23/2021    K 4.0 09/23/2021     09/23/2021    CO2 24 09/23/2021     No results found for: TSH, M5FUJGW, Q1SGJKQ, THYROIDAB  Lab Results   Component Value Date    WBC 5.7 09/23/2021    HGB 13.3 09/23/2021    HCT 39.9 09/23/2021    MCV 88.2 2021     2021     No components found for: CHLPL  No results found for: TRIG  No results found for: HDL  No results found for: LDLCALC  No results found for: LABVLDL       No results found for: BNP  Radiology Review:  Pertinent images / reports were reviewed as a part of this visit and reveals the following:    Last Echo: 21   Summary   Overall left ventricular function is normal.   Ejection fraction is visually estimated to be 55-60 %. E/e'=8.3   No regional wall motion abnormalities are noted. Grade I diastolic dysfunction with normal LV filling pressures. Mild tricuspid regurgitation. Estimated pulmonary artery systolic pressure is 20 mmHg assuming a right   atrial pressure of 3 mmHg. The pulmonic valve is not well visualized. There is no evidence of pulmonic valve regurgitation or stenosis. The inferior vena cava appears normal in size with normal respiratory   variation. Last Stress Echo Test: 21   Summary   Normal stress echocardiogram study. Summary   Left ventricular systolic function is normal with ejection fraction   estimated at 60-65 %. No regional wall motion abnormalities are noted. Normal left ventricular wall thickness. There is reversal of E/A inflow velocities across the mitral valve. Mild tricuspid regurgitation. Event Monitor 3/2021  SR, prolonged IN. No symptoms reported    Last EC22  Sinus  Bradycardia   Low voltage in precordial leads. Assessment:      Diagnosis Orders   1. Essential hypertension   ~ controlled; 124/70 in office today        2. Palpitation / Tachycardia   ~ palpitations have improved overall, she attributes current palpitations to menopause   ~ reports elevated HR with exercise- recovers quickly with rest   ~ Event monitor 3/2021 SR, prolonged IN. No symptoms reported    ~ Event monitor 2019 SR, avg HR 70 ( ).   Rare PVCs noted, Rare PACs.    ~ Stress Echo 2019 normal   ~ Toprol 25 EKG 12

## 2022-09-19 RX ORDER — METOPROLOL SUCCINATE 25 MG/1
TABLET, EXTENDED RELEASE ORAL
Qty: 90 TABLET | Refills: 3 | Status: SHIPPED | OUTPATIENT
Start: 2022-09-19

## 2022-09-19 RX ORDER — AMLODIPINE BESYLATE 5 MG/1
TABLET ORAL
Qty: 90 TABLET | Refills: 3 | Status: SHIPPED | OUTPATIENT
Start: 2022-09-19

## 2023-04-19 ENCOUNTER — OFFICE VISIT (OUTPATIENT)
Dept: CARDIOLOGY CLINIC | Age: 58
End: 2023-04-19
Payer: COMMERCIAL

## 2023-04-19 VITALS
HEIGHT: 62 IN | HEART RATE: 60 BPM | SYSTOLIC BLOOD PRESSURE: 118 MMHG | BODY MASS INDEX: 35.51 KG/M2 | WEIGHT: 193 LBS | DIASTOLIC BLOOD PRESSURE: 80 MMHG | OXYGEN SATURATION: 97 %

## 2023-04-19 DIAGNOSIS — R06.02 SHORTNESS OF BREATH: ICD-10-CM

## 2023-04-19 DIAGNOSIS — R00.0 TACHYCARDIA: ICD-10-CM

## 2023-04-19 DIAGNOSIS — R00.2 PALPITATION: ICD-10-CM

## 2023-04-19 DIAGNOSIS — G47.33 OSA (OBSTRUCTIVE SLEEP APNEA): Primary | ICD-10-CM

## 2023-04-19 DIAGNOSIS — I10 ESSENTIAL HYPERTENSION: ICD-10-CM

## 2023-04-19 PROCEDURE — 3074F SYST BP LT 130 MM HG: CPT | Performed by: INTERNAL MEDICINE

## 2023-04-19 PROCEDURE — 93000 ELECTROCARDIOGRAM COMPLETE: CPT | Performed by: INTERNAL MEDICINE

## 2023-04-19 PROCEDURE — 99214 OFFICE O/P EST MOD 30 MIN: CPT | Performed by: INTERNAL MEDICINE

## 2023-04-19 PROCEDURE — 3079F DIAST BP 80-89 MM HG: CPT | Performed by: INTERNAL MEDICINE

## 2023-04-19 NOTE — PATIENT INSTRUCTIONS
Schedule a stress Echo before you leave today  May consider a cardiac CTA in the future based on testing.    Talk to your sleep doctor about Inspire implant   15-20 mm compression stockings (knee high)

## 2023-05-08 ENCOUNTER — HOSPITAL ENCOUNTER (OUTPATIENT)
Dept: NON INVASIVE DIAGNOSTICS | Age: 58
Discharge: HOME | End: 2023-05-08
Payer: COMMERCIAL

## 2023-05-08 DIAGNOSIS — R06.02 SHORTNESS OF BREATH: ICD-10-CM

## 2023-05-08 LAB
LV EF: 50 %
LVEF MODALITY: NORMAL

## 2023-05-08 PROCEDURE — 93320 DOPPLER ECHO COMPLETE: CPT

## 2023-05-08 PROCEDURE — 93351 STRESS TTE COMPLETE: CPT

## 2023-06-13 NOTE — TELEPHONE ENCOUNTER
Full therapy note has been documented and is under restricted viewing.  Please see below for summary of today's session.      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 Support, problem solving     Treatment Goal(s)/Objective(s) addressed: Asking for help, being clear with her needs, setting boundaries     Progress toward Treatment Goals: improved   Plan:      - Continue   therapy  in two weeks.    Oanh Carlisle, Ph.D.   Left message on machine with results and patient to call back with any question or concerns.

## 2023-07-19 ENCOUNTER — OFFICE VISIT (OUTPATIENT)
Dept: CARDIOLOGY CLINIC | Age: 58
End: 2023-07-19
Payer: COMMERCIAL

## 2023-07-19 VITALS
WEIGHT: 191 LBS | BODY MASS INDEX: 34.93 KG/M2 | DIASTOLIC BLOOD PRESSURE: 78 MMHG | HEART RATE: 62 BPM | SYSTOLIC BLOOD PRESSURE: 124 MMHG | OXYGEN SATURATION: 99 %

## 2023-07-19 DIAGNOSIS — I10 PRIMARY HYPERTENSION: ICD-10-CM

## 2023-07-19 DIAGNOSIS — R06.02 SOB (SHORTNESS OF BREATH): Primary | ICD-10-CM

## 2023-07-19 DIAGNOSIS — R00.0 TACHYCARDIA: ICD-10-CM

## 2023-07-19 PROCEDURE — 3074F SYST BP LT 130 MM HG: CPT | Performed by: NURSE PRACTITIONER

## 2023-07-19 PROCEDURE — 3078F DIAST BP <80 MM HG: CPT | Performed by: NURSE PRACTITIONER

## 2023-07-19 PROCEDURE — 99214 OFFICE O/P EST MOD 30 MIN: CPT | Performed by: NURSE PRACTITIONER

## 2023-07-19 NOTE — PATIENT INSTRUCTIONS
Component 05/05/23  05/03/22  10/27/21  06/07/21  06/01/20    CHOLESTEROL, TOTAL 216 High   160  223 High   211 High   205 High     TRIGLYCERIDE 69  50  66  56  77    HDL CHOLESTEROL 71  69  66  76  71    VLDL 14 10 13 11 --   LDL (CALCULATED) 131  81  144 High   124 High   119 High       With your next cholesterol levels have a \"NMR\" ; its a breakdown of particle sizes ; if you have more small particle LDL, those are atherogenic

## 2023-08-22 DIAGNOSIS — E78.5 HYPERLIPIDEMIA, UNSPECIFIED HYPERLIPIDEMIA TYPE: Primary | ICD-10-CM

## 2023-08-24 ENCOUNTER — HOSPITAL ENCOUNTER (OUTPATIENT)
Age: 58
Discharge: HOME | End: 2023-08-24
Payer: COMMERCIAL

## 2023-08-24 DIAGNOSIS — E78.5 HYPERLIPIDEMIA, UNSPECIFIED HYPERLIPIDEMIA TYPE: ICD-10-CM

## 2023-08-24 PROCEDURE — 80061 LIPID PANEL: CPT

## 2023-08-24 PROCEDURE — 83704 LIPOPROTEIN BLD QUAN PART: CPT

## 2023-08-28 ENCOUNTER — TELEPHONE (OUTPATIENT)
Dept: CARDIOLOGY CLINIC | Age: 58
End: 2023-08-28

## 2023-08-28 LAB
CHOLEST SERPL-MCNC: 189 MG/DL
HDL SERPL QN: 8.8 NM
HDL SERPL-SCNC: 35.3 UMOL/L
HDLC SERPL-MCNC: 57 MG/DL (ref 40–59)
HLD.LARGE SERPL-SCNC: 4.8 UMOL/L
LDL SERPL QN: 20.7 NM
LDL SERPL-SCNC: 1368 NMOL/L
LDL SMALL SERPL-SCNC: 670 NMOL/L
LDLC SERPL CALC-MCNC: 117 MG/DL
PATHOLOGY STUDY: ABNORMAL
TRIGL SERPL-MCNC: 75 MG/DL (ref 30–149)
VLDL LARGE SERPL-SCNC: 1.8 NMOL/L
VLDL SERPL QN: 47.9 NM

## 2023-08-28 RX ORDER — ROSUVASTATIN CALCIUM 10 MG/1
10 TABLET, COATED ORAL DAILY
Qty: 90 TABLET | Refills: 1 | Status: SHIPPED | OUTPATIENT
Start: 2023-08-28

## 2023-08-28 NOTE — TELEPHONE ENCOUNTER
Pt states she had a labs done that show high cholesterol. Asking if she can get script called into local pharmacy   [address] [phone] - f [phone] . 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 Please call pt to advise.

## 2023-08-28 NOTE — TELEPHONE ENCOUNTER
Crestor 10 mg send to her pharmacy. Spoke with patient.  V/u [place] on any potential side effects

## 2023-11-02 ENCOUNTER — TELEPHONE (OUTPATIENT)
Dept: CARDIOLOGY CLINIC | Age: 58
End: 2023-11-02

## 2023-11-03 ENCOUNTER — OFFICE VISIT (OUTPATIENT)
Dept: CARDIOLOGY CLINIC | Age: 58
End: 2023-11-03
Payer: COMMERCIAL

## 2023-11-03 VITALS
SYSTOLIC BLOOD PRESSURE: 122 MMHG | BODY MASS INDEX: 36.8 KG/M2 | DIASTOLIC BLOOD PRESSURE: 80 MMHG | OXYGEN SATURATION: 99 % | HEIGHT: 62 IN | HEART RATE: 70 BPM | WEIGHT: 200 LBS

## 2023-11-03 DIAGNOSIS — I10 PRIMARY HYPERTENSION: ICD-10-CM

## 2023-11-03 DIAGNOSIS — R06.02 SOB (SHORTNESS OF BREATH): ICD-10-CM

## 2023-11-03 DIAGNOSIS — M79.10 MUSCULAR ACHES: Primary | ICD-10-CM

## 2023-11-03 DIAGNOSIS — R00.0 TACHYCARDIA: ICD-10-CM

## 2023-11-03 DIAGNOSIS — Z79.899 LONG-TERM USE OF HIGH-RISK MEDICATION: ICD-10-CM

## 2023-11-03 PROCEDURE — 99214 OFFICE O/P EST MOD 30 MIN: CPT | Performed by: NURSE PRACTITIONER

## 2023-11-03 PROCEDURE — 3074F SYST BP LT 130 MM HG: CPT | Performed by: NURSE PRACTITIONER

## 2023-11-03 PROCEDURE — 3079F DIAST BP 80-89 MM HG: CPT | Performed by: NURSE PRACTITIONER

## 2023-11-03 RX ORDER — ATORVASTATIN CALCIUM 10 MG/1
10 TABLET, FILM COATED ORAL DAILY
Qty: 30 TABLET | Refills: 5 | Status: SHIPPED | OUTPATIENT
Start: 2023-11-03

## 2023-11-03 RX ORDER — AMLODIPINE BESYLATE 5 MG/1
2.5 TABLET ORAL DAILY
Qty: 90 TABLET | Refills: 3
Start: 2023-11-03

## 2023-11-03 NOTE — PROGRESS NOTES
[facility]     Outpatient Follow Up Note    Oscar Winter is 65 y.o. female who presents today with a history of tachycardia and SOB. CHIEF COMPLAINT / HPI:  Follow Up secondary to calling with c/o cramping attributed to medications : crestor 10 mg daily    Subjective:   She took crestor for three days then developed discomfort in her legs and foot. She stopped taking it and it went away. She tried again a week later and the same thing happened    Her bp runs ~ 109/73. She has low HR at HS (46)      She denies significant chest pain. There is ANADN at times. The patient denies orthopnea/PND. She has a CPAP but finds herself pulling it off. The patient does not have swelling. The patients weight is  up 9# / 3 months by office scales. She attributes her wt gain to inactivity d/t discomfort from kidney stone. The patient is experiencing palpitations that come along with her hot flashes. These symptoms show no change since the last OV. With regard to medication therapy the patient has been compliant with prescribed regimen. They have tolerated therapy to date. Past Medical History:   Diagnosis Date    Hypertension     MACK (obstructive sleep apnea) 11/7/2019     Social History:    Social History     Tobacco Use   Smoking Status Never   Smokeless Tobacco Never     Current Medications:  Current Outpatient Medications   Medication Sig Dispense Refill    amLODIPine (NORVASC) 5 MG tablet TAKE 1 TABLET BY MOUTH EVERY DAY 90 tablet 3    metoprolol succinate (TOPROL XL) 25 MG extended release tablet TAKE 1 TABLET BY MOUTH EVERY DAY 90 tablet 3    magnesium 30 MG tablet Take 1 tablet by mouth daily      vitamin D (CHOLECALCIFEROL) 125 MCG (5000 UT) CAPS capsule Take 1 capsule by mouth daily      rosuvastatin (CRESTOR) 10 MG tablet Take 1 tablet by mouth daily 90 tablet 1     No current facility-administered medications for this visit.      REVIEW OF SYSTEMS:    CONSTITUTIONAL: No major weight gain or

## 2023-11-03 NOTE — PATIENT INSTRUCTIONS
Decrease amlodipine to 1/2 tablet or 2.5 mg daily : see if this helps lessen your leg swelling    Begin low dose atrovastatin 10 mg tablets at 1/2 tablet every evening for 2 weeks; if you tolerate it, increase to 1 tablet daily thereafter  Taken OTC CoQ 10 along with the atorvastatin     Fasting labs after 6 weeks if you tolerate it

## 2023-11-23 NOTE — TELEPHONE ENCOUNTER
Creatine stable for now. BMP reviewed- noted Estimated Creatinine Clearance: 14.1 mL/min (A) (based on SCr of 5.6 mg/dL (H)). according to latest data. Based on current GFR, CKD stage is stage 5 - GFR < 15.  Monitor UOP and serial BMP and adjust therapy as needed. Renally dose meds. Avoid nephrotoxic medications and procedures.   MCOT was placed 09/02. We are  awaiting her results to schedule.

## 2023-12-10 SDOH — HEALTH STABILITY: PHYSICAL HEALTH: ON AVERAGE, HOW MANY MINUTES DO YOU ENGAGE IN EXERCISE AT THIS LEVEL?: 10 MIN

## 2023-12-10 SDOH — HEALTH STABILITY: PHYSICAL HEALTH: ON AVERAGE, HOW MANY DAYS PER WEEK DO YOU ENGAGE IN MODERATE TO STRENUOUS EXERCISE (LIKE A BRISK WALK)?: 1 DAY

## 2023-12-13 ENCOUNTER — OFFICE VISIT (OUTPATIENT)
Dept: INTERNAL MEDICINE CLINIC | Age: 58
End: 2023-12-13
Payer: COMMERCIAL

## 2023-12-13 VITALS
OXYGEN SATURATION: 97 % | HEIGHT: 62 IN | BODY MASS INDEX: 37.54 KG/M2 | HEART RATE: 80 BPM | SYSTOLIC BLOOD PRESSURE: 120 MMHG | DIASTOLIC BLOOD PRESSURE: 80 MMHG | WEIGHT: 204 LBS

## 2023-12-13 DIAGNOSIS — N39.0 ACUTE UTI: ICD-10-CM

## 2023-12-13 DIAGNOSIS — I10 PRIMARY HYPERTENSION: Primary | ICD-10-CM

## 2023-12-13 DIAGNOSIS — Z13.9 SCREENING FOR CONDITION: ICD-10-CM

## 2023-12-13 DIAGNOSIS — R06.02 SOBOE (SHORTNESS OF BREATH ON EXERTION): ICD-10-CM

## 2023-12-13 DIAGNOSIS — R00.0 TACHYCARDIA: ICD-10-CM

## 2023-12-13 DIAGNOSIS — E66.9 OBESITY (BMI 30-39.9): ICD-10-CM

## 2023-12-13 DIAGNOSIS — N20.0 KIDNEY STONE: ICD-10-CM

## 2023-12-13 DIAGNOSIS — Z71.84 ENCOUNTER FOR COUNSELING FOR TRAVEL: ICD-10-CM

## 2023-12-13 DIAGNOSIS — J30.89 OTHER ALLERGIC RHINITIS: ICD-10-CM

## 2023-12-13 DIAGNOSIS — E78.49 OTHER HYPERLIPIDEMIA: ICD-10-CM

## 2023-12-13 DIAGNOSIS — F43.10 PTSD (POST-TRAUMATIC STRESS DISORDER): ICD-10-CM

## 2023-12-13 DIAGNOSIS — G47.33 OSA (OBSTRUCTIVE SLEEP APNEA): ICD-10-CM

## 2023-12-13 LAB
BILIRUBIN, POC: NEGATIVE
BLOOD URINE, POC: NORMAL
CLARITY, POC: CLEAR
COLOR, POC: YELLOW
GLUCOSE URINE, POC: NEGATIVE
KETONES, POC: NEGATIVE
LEUKOCYTE EST, POC: NORMAL
NITRITE, POC: NEGATIVE
PH, POC: 5.5
PROTEIN, POC: NEGATIVE
SPECIFIC GRAVITY, POC: 1.02
UROBILINOGEN, POC: NORMAL

## 2023-12-13 PROCEDURE — 3079F DIAST BP 80-89 MM HG: CPT | Performed by: INTERNAL MEDICINE

## 2023-12-13 PROCEDURE — 81002 URINALYSIS NONAUTO W/O SCOPE: CPT | Performed by: INTERNAL MEDICINE

## 2023-12-13 PROCEDURE — 3074F SYST BP LT 130 MM HG: CPT | Performed by: INTERNAL MEDICINE

## 2023-12-13 PROCEDURE — 99204 OFFICE O/P NEW MOD 45 MIN: CPT | Performed by: INTERNAL MEDICINE

## 2023-12-13 RX ORDER — PRAZOSIN HYDROCHLORIDE 1 MG/1
1 CAPSULE ORAL 2 TIMES DAILY
COMMUNITY
Start: 2022-04-01

## 2023-12-13 RX ORDER — AMLODIPINE BESYLATE 5 MG/1
5 TABLET ORAL DAILY
Qty: 180 TABLET | Refills: 0 | Status: SHIPPED | OUTPATIENT
Start: 2023-12-13

## 2023-12-13 RX ORDER — AZITHROMYCIN 500 MG/1
500 TABLET, FILM COATED ORAL DAILY
Qty: 1 PACKET | Refills: 0 | Status: SHIPPED | OUTPATIENT
Start: 2023-12-13 | End: 2023-12-16

## 2023-12-13 RX ORDER — PRAVASTATIN SODIUM 10 MG
10 TABLET ORAL NIGHTLY
Qty: 90 TABLET | Refills: 0 | Status: SHIPPED | OUTPATIENT
Start: 2023-12-13

## 2023-12-13 RX ORDER — NITROFURANTOIN 25; 75 MG/1; MG/1
100 CAPSULE ORAL 2 TIMES DAILY
Qty: 10 CAPSULE | Refills: 0 | Status: SHIPPED | OUTPATIENT
Start: 2023-12-13 | End: 2023-12-18

## 2023-12-13 SDOH — ECONOMIC STABILITY: FOOD INSECURITY: WITHIN THE PAST 12 MONTHS, THE FOOD YOU BOUGHT JUST DIDN'T LAST AND YOU DIDN'T HAVE MONEY TO GET MORE.: NEVER TRUE

## 2023-12-13 SDOH — ECONOMIC STABILITY: HOUSING INSECURITY
IN THE LAST 12 MONTHS, WAS THERE A TIME WHEN YOU DID NOT HAVE A STEADY PLACE TO SLEEP OR SLEPT IN A SHELTER (INCLUDING NOW)?: NO

## 2023-12-13 SDOH — ECONOMIC STABILITY: INCOME INSECURITY: HOW HARD IS IT FOR YOU TO PAY FOR THE VERY BASICS LIKE FOOD, HOUSING, MEDICAL CARE, AND HEATING?: NOT HARD AT ALL

## 2023-12-13 SDOH — ECONOMIC STABILITY: FOOD INSECURITY: WITHIN THE PAST 12 MONTHS, YOU WORRIED THAT YOUR FOOD WOULD RUN OUT BEFORE YOU GOT MONEY TO BUY MORE.: NEVER TRUE

## 2023-12-13 ASSESSMENT — PATIENT HEALTH QUESTIONNAIRE - PHQ9
SUM OF ALL RESPONSES TO PHQ9 QUESTIONS 1 & 2: 0
2. FEELING DOWN, DEPRESSED OR HOPELESS: 0
SUM OF ALL RESPONSES TO PHQ QUESTIONS 1-9: 0
SUM OF ALL RESPONSES TO PHQ QUESTIONS 1-9: 0
1. LITTLE INTEREST OR PLEASURE IN DOING THINGS: 0
SUM OF ALL RESPONSES TO PHQ QUESTIONS 1-9: 0
SUM OF ALL RESPONSES TO PHQ QUESTIONS 1-9: 0

## 2023-12-13 NOTE — PATIENT INSTRUCTIONS
TAKE MED AS ADVISED    DIET/ EXERCISE. FOLLOW UP WITHIN 4 MONTHS / AS NEEDED    FOLLOW UP FOR FASTING [address] [facility] Laboratory Locations - No appointment necessary. ? indicates the location is open Saturdays in addition to Monday through Friday. Call your preferred location for test preparation, business hours and other information you need. [employer] accepts [employer]. CENTRAL  EAST  [place]    ? [place] [id number] [facility] [facility], [address]    Ph: [address] [facility], [facility] [address]    Ph: [phone]   ? [address]    [place], [address] [facility]    Ph: [address], [address] [facility] [address]    Ph: [phone] ? [place]   [address] [place], [place] [address]e [facility], [address] [place] [address]   Ph: [phone]  ? [address] [place] [address], [address] [facility]    Ph: [place] [address] [facility], [address] [facility]   Ph: [phone]    NORTH    ? [place], [place] [address]    Ph: [phone]  [facility]   [address] [facility], [facility]   Ph: [address]. [place] [phone]    [facility]: [address] [place] [address] Dr. Tomeka Mcmahon, [facility]5 [facility]    Ph: [address] [facility].  [facility] [place], [place] [address]    Ph: [phone]

## 2023-12-26 ENCOUNTER — TELEPHONE (OUTPATIENT)
Dept: CARDIOLOGY CLINIC | Age: 58
End: 2023-12-26

## 2023-12-26 ENCOUNTER — HOSPITAL ENCOUNTER (OUTPATIENT)
Age: 58
Discharge: HOME | End: 2023-12-26
Payer: COMMERCIAL

## 2023-12-26 DIAGNOSIS — Z79.899 LONG-TERM USE OF HIGH-RISK MEDICATION: ICD-10-CM

## 2023-12-26 LAB
ALBUMIN SERPL-MCNC: 4.6 G/DL (ref 3.4–5)
ALP SERPL-CCNC: 114 U/L (ref 40–129)
ALT SERPL-CCNC: 17 U/L (ref 10–40)
AST SERPL-CCNC: 23 U/L (ref 15–37)
BILIRUB DIRECT SERPL-MCNC: <0.2 MG/DL (ref 0–0.3)
BILIRUB INDIRECT SERPL-MCNC: NORMAL MG/DL (ref 0–1)
BILIRUB SERPL-MCNC: 0.5 MG/DL (ref 0–1)
PROT SERPL-MCNC: 7.3 G/DL (ref 6.4–8.2)

## 2023-12-26 PROCEDURE — 80061 LIPID PANEL: CPT

## 2023-12-26 PROCEDURE — 80076 HEPATIC FUNCTION PANEL: CPT

## 2023-12-26 PROCEDURE — 83704 LIPOPROTEIN BLD QUAN PART: CPT

## 2023-12-26 PROCEDURE — 36415 COLL VENOUS BLD VENIPUNCTURE: CPT

## 2023-12-26 RX ORDER — METOPROLOL SUCCINATE 25 MG/1
25 TABLET, EXTENDED RELEASE ORAL DAILY
Qty: 90 TABLET | Refills: 1 | OUTPATIENT
Start: 2023-12-26

## 2023-12-29 LAB
CHOLEST SERPL-MCNC: 208 MG/DL
HDL SERPL QN: 8.8 NM
HDL SERPL-SCNC: 40 UMOL/L
HDLC SERPL-MCNC: 62 MG/DL (ref 40–59)
HLD.LARGE SERPL-SCNC: 6 UMOL/L
LDL SERPL QN: 20.8 NM
LDL SERPL-SCNC: 1571 NMOL/L
LDL SMALL SERPL-SCNC: 627 NMOL/L
LDLC SERPL CALC-MCNC: 128 MG/DL
PATHOLOGY STUDY: ABNORMAL
TRIGL SERPL-MCNC: 88 MG/DL (ref 30–149)
VLDL LARGE SERPL-SCNC: 3.4 NMOL/L
VLDL SERPL QN: 48.3 NM

## 2024-01-03 ENCOUNTER — OFFICE VISIT (OUTPATIENT)
Dept: PULMONOLOGY | Age: 59
End: 2024-01-03
Payer: COMMERCIAL

## 2024-01-03 VITALS
OXYGEN SATURATION: 97 % | HEART RATE: 63 BPM | HEIGHT: 62 IN | BODY MASS INDEX: 36.8 KG/M2 | SYSTOLIC BLOOD PRESSURE: 130 MMHG | DIASTOLIC BLOOD PRESSURE: 81 MMHG | WEIGHT: 200 LBS

## 2024-01-03 DIAGNOSIS — E66.9 NON MORBID OBESITY, UNSPECIFIED OBESITY TYPE: Chronic | ICD-10-CM

## 2024-01-03 DIAGNOSIS — I10 ESSENTIAL HYPERTENSION: Chronic | ICD-10-CM

## 2024-01-03 DIAGNOSIS — G47.33 OSA (OBSTRUCTIVE SLEEP APNEA): Primary | Chronic | ICD-10-CM

## 2024-01-03 PROCEDURE — 3079F DIAST BP 80-89 MM HG: CPT | Performed by: NURSE PRACTITIONER

## 2024-01-03 PROCEDURE — 99204 OFFICE O/P NEW MOD 45 MIN: CPT | Performed by: NURSE PRACTITIONER

## 2024-01-03 PROCEDURE — 3075F SYST BP GE 130 - 139MM HG: CPT | Performed by: NURSE PRACTITIONER

## 2024-01-03 RX ORDER — DOCUSATE SODIUM 100 MG/1
100 CAPSULE, LIQUID FILLED ORAL 2 TIMES DAILY
COMMUNITY

## 2024-01-03 ASSESSMENT — SLEEP AND FATIGUE QUESTIONNAIRES
HOW LIKELY ARE YOU TO NOD OFF OR FALL ASLEEP WHILE SITTING AND READING: 0
HOW LIKELY ARE YOU TO NOD OFF OR FALL ASLEEP WHILE SITTING AND TALKING TO SOMEONE: 0
HOW LIKELY ARE YOU TO NOD OFF OR FALL ASLEEP WHILE SITTING INACTIVE IN A PUBLIC PLACE: 0
HOW LIKELY ARE YOU TO NOD OFF OR FALL ASLEEP WHILE LYING DOWN TO REST IN THE AFTERNOON WHEN CIRCUMSTANCES PERMIT: 0
ESS TOTAL SCORE: 2
HOW LIKELY ARE YOU TO NOD OFF OR FALL ASLEEP IN A CAR, WHILE STOPPED FOR A FEW MINUTES IN TRAFFIC: 0
HOW LIKELY ARE YOU TO NOD OFF OR FALL ASLEEP WHEN YOU ARE A PASSENGER IN A CAR FOR AN HOUR WITHOUT A BREAK: 0
HOW LIKELY ARE YOU TO NOD OFF OR FALL ASLEEP WHILE SITTING QUIETLY AFTER LUNCH WITHOUT ALCOHOL: 0
HOW LIKELY ARE YOU TO NOD OFF OR FALL ASLEEP WHILE WATCHING TV: 2

## 2024-01-03 NOTE — ASSESSMENT & PLAN NOTE
Chronic - Question if stable: Reviewed and analyzed results of physiologic download from patient's machine and reviewed with patients. Supplies and parts as needed for the machine. These are medically necessary. Limit caffeine use after 3 PM. Based on the analyzed data, we will make the following changes: P min increased to 8, Ramp time extended to 10 min. Likely she may need further pressure adjustment to accommodate pressure needs but will take smaller adjustments due to concerns for aerophagia. She will try increased pressure for the next few days and she will get back to me if she still feels like she needs more pressure/still taking the mask off during sleep without realizing or still having dryness in the throat. Discussed adjusting humidity settings on the machine for comfort. We may need to consider switching to a bilevel machine if her aerophagia worsens, especially after pressure increase. Briefly discussed difference between the CPAP and the bilevel machine. Will re-visit this when she returns for the follow up. A copy of prescription for supplies was provided so she can purchase supplies out of pocket until she meets compliance for the insurance. Advised against use of So Clean/the ozone  or any devices that are not approved by FDA due to health concerns. She verbalized understanding. General instructions for cleaning/disinfecting supplies were provided for reference. Would like to follow up in 2 mo or so but she will be out of country for 3-4 mo after next week. She will return for follow up in April when she returns to the country. Instructed her to contact the office if experiences new or worsening of symptoms. Encouraged her to use the machine each night, all night. Encouraged the patient to contact the office with any questions or concerns.    Discussed the importance of consistence use of the machine and encouraged consistent use of the machine each night. Also discussed the importance of

## 2024-01-03 NOTE — PROGRESS NOTES
Diagnosis: [x] MACK (G47.33) [] CSA (G47.31) [] Apnea (G47.30)   Length of Need: [x] 18 Months [] 99 Months [] Other:   Machine (MADELINE!): [] Respironics Dream Station   2   Auto [] ResMed AirSense     Auto 11 [] Other:     []  CPAP () [] Bilevel ()   Mode: [] Auto [] Spontaneous    Mode: [] Auto [] Spontaneous             Comfort Settings:      Humidifier: [] Heated ()        [x] Water chamber replacement ()/ 1 per 6 months        Mask:   [] Nasal () /1 per 3 months [x] Full Face () /1 per 3 months   [] Patient choice -Size and fit mask [x] Patient Choice - Size and fit mask   [] Dispense: [] Dispense:   [] Headgear () / 1 per 3 months [x] Headgear () / 1 per 3 months   [] Replacement Nasal Cushion ()/2 per month [x] Interface Replacement ()/1 per month   [] Replacement Nasal Pillows ()/2 per month         Tubing: [x] Heated ()/1 per 3 months    [] Standard ()/1 per 3 months [] Other:           Filters: [x] Non-disposable ()/1 per 6 months     [x] Ultra-Fine, Disposable ()/2 per month        Miscellaneous: [] Chin Strap ()/ 1 per 6 months [] O2 bleed-in:        LPM   [] Oxymetry on CPAP/Bilevel [x]  Other: Modem: ()         Start Order Date: 24    MEDICAL JUSTIFICATION:  I, the undersigned, certify that the above prescribed supplies are medically necessary for this patient’s wellbeing.  In my opinion, the supplies are both reasonable and necessary in reference to accepted standards of medicalpractice in treatment of this patient’s condition.    Maki Rushing CNP    NPI: [id number]     Order Signed Date: 24    Josy Braun  1965  [address]  [address]  [place] [address]  [phone] (home)   [phone] (mobile)      Insurance Info (confirm with patient if correct):  Payer/Plan Subscr  Sex Relation Sub. Ins. ID Effective Group Num

## 2024-01-03 NOTE — PROGRESS NOTES
01/03/24 130/81   12/13/23 120/80   11/03/23 122/80    Pulse Readings from Last 3 Encounters:   01/03/24 63   12/13/23 80   11/03/23 70    SpO2 Readings from Last 3 Encounters:   01/03/24 97%   12/13/23 97%   11/03/23 99%        Electronically signed by SRINATH Gabriel CNP on 1/3/2024 at 4:48 PM   Intermediate Repair Preamble Text (Leave Blank If You Do Not Want): Undermining was performed with blunt dissection.

## 2024-01-03 NOTE — ASSESSMENT & PLAN NOTE
Chronic- Stable.  Discussed the importance of treating sleep apnea as part of the management of this disorder.  Cont any meds per PCP and other physicians.

## 2024-01-05 ENCOUNTER — TELEPHONE (OUTPATIENT)
Dept: INTERNAL MEDICINE CLINIC | Age: 59
End: 2024-01-05

## 2024-01-05 ENCOUNTER — TELEPHONE (OUTPATIENT)
Dept: CARDIOLOGY CLINIC | Age: 59
End: 2024-01-05

## 2024-01-05 DIAGNOSIS — E78.49 OTHER HYPERLIPIDEMIA: ICD-10-CM

## 2024-01-05 RX ORDER — PRAVASTATIN SODIUM 20 MG
20 TABLET ORAL NIGHTLY
Qty: 90 TABLET | Refills: 2 | Status: SHIPPED | OUTPATIENT
Start: 2024-01-05

## 2024-01-05 NOTE — TELEPHONE ENCOUNTER
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

## 2024-01-05 NOTE — TELEPHONE ENCOUNTER
Patient says that her Cardiologist  ran some test and stated that her PMR was elevated including her chlestorel and told her she needs to have the pravastatin 10 mg increase to 20 mg.  They instructed her to go ahead and start taking 2 a day and to have you write her a new prescription.    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  Please advise

## 2024-01-05 NOTE — TELEPHONE ENCOUNTER
Pt stated and she said due to the telephone encounter on 12/26/2024 about increasing Lipitor PCP stated she needed to reach out to [facility] for refills. The Lipitor is not on medication list. 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 Please advise.

## 2024-01-07 ENCOUNTER — PATIENT MESSAGE (OUTPATIENT)
Dept: PULMONOLOGY | Age: 59
End: 2024-01-07

## 2024-01-08 NOTE — TELEPHONE ENCOUNTER
From: Josy Braun  To: Maki Rushing  Sent: 1/7/2024 2:25 PM EST  Subject: CPAP Machine Air Pressure    Hello Ma'am,     I want to know if you can decrease the pressure/air flow of the CPCP machine. Not to the original setting in which you examined it.     When using the CPAP now, it seems that I am not breathing deep or fast enough and I notice that I receiving more leaks through out the night. I am not sure that the force of the air flowing into the mask and the force breathing out is not strong enough as I feel that I am not able to synch my breathing (hard to explain). This wakes me and I awake and eventually take it off after going through the episode.     Regards,   Josy Braun

## 2024-01-08 NOTE — TELEPHONE ENCOUNTER
PRAVACHOL ALREADY CHANGED TO 20 MG DPER CARDIOLOGY ON 1/5/24 SENT TO [facility] IN [place]   HAVE PT CONTACT PHARMACY

## 2024-01-31 RX ORDER — PITAVASTATIN 1 MG/1
1 TABLET, FILM COATED ORAL NIGHTLY
Qty: 30 TABLET | Refills: 1 | Status: SHIPPED | OUTPATIENT
Start: 2024-01-31

## 2024-04-11 NOTE — PROGRESS NOTES
[facility] HEART [place]    2024    Josy Braun (:  1965) is a 58 y.o. female who is here for follow up on her history of hypertension and palpitations.    Referring Provider: Jeronimo Cohen MD    HISTORY:  Ms Braun has a history of hypertension, tachycardia, and shortness of breath.  She has sleep apnea treated with CPAP.  She was previously followed by a cardiologist in [place], [place].  Her son  from complications of malignant hypertensive heart disease.     In 2018, Amlodipine and HCTZ were added to Metoprolol XL, however she stopped taking HCTZ after she developed  \"alligator\" skin. BP at home can be as high as 149-150/90.  She also noticed better blood pressure control when taking BP meds twice a day.  2018 Holter demonstrated SR with avg HR 62, < 1% PAC and PVC burden.  Earlier in , she noticed her heart rate would increase to 160 bpm with exercise which was unusual for her.  She noted both heart racing and heart pounding, associated with shortness of breath when exercising.  She denied exertional chest pain, shortness of breath with normal activities, stairs etc.  She is experiencing more palpitations than she did when she wore the cardiac monitor.  She will be going to [facility] for six months for her job with [employer].  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  She has used HCTZ in the past, but has an allergy to sulfa.      Today she comes in with reports of leg cramping on the pravastatin, she was never able to get the Livalo, she is unsure why or what happened. She is willing to try it again. She does notice a lot of leg swelling when she walks all day long ( for work), has been on amlodipine for a while.     Patient is compliant with medications and is tolerating them well without side effects.         REVIEW OF SYSTEMS:  A complete review of systems was reviewed and is negative except as noted in the

## 2024-04-23 NOTE — PATIENT INSTRUCTIONS
Will try 1 mg livalo  Repeat your fasting labs in 2 months  We can go up if needed.   Stop amlodipine   Toprol Xl up to 50 mg   Just watch your BP/HR during the changes (bp should be < 140/90) let us know if it is staying above that consistently)

## 2024-04-24 ENCOUNTER — OFFICE VISIT (OUTPATIENT)
Dept: CARDIOLOGY CLINIC | Age: 59
End: 2024-04-24
Payer: COMMERCIAL

## 2024-04-24 VITALS
HEIGHT: 62 IN | WEIGHT: 194 LBS | OXYGEN SATURATION: 98 % | HEART RATE: 78 BPM | DIASTOLIC BLOOD PRESSURE: 80 MMHG | BODY MASS INDEX: 35.7 KG/M2 | SYSTOLIC BLOOD PRESSURE: 122 MMHG

## 2024-04-24 DIAGNOSIS — I10 ESSENTIAL HYPERTENSION: Primary | Chronic | ICD-10-CM

## 2024-04-24 DIAGNOSIS — I10 PRIMARY HYPERTENSION: ICD-10-CM

## 2024-04-24 DIAGNOSIS — R00.2 PALPITATION: ICD-10-CM

## 2024-04-24 DIAGNOSIS — E78.2 MIXED HYPERLIPIDEMIA: ICD-10-CM

## 2024-04-24 PROCEDURE — 93000 ELECTROCARDIOGRAM COMPLETE: CPT | Performed by: INTERNAL MEDICINE

## 2024-04-24 PROCEDURE — 3074F SYST BP LT 130 MM HG: CPT | Performed by: INTERNAL MEDICINE

## 2024-04-24 PROCEDURE — 99214 OFFICE O/P EST MOD 30 MIN: CPT | Performed by: INTERNAL MEDICINE

## 2024-04-24 PROCEDURE — 3079F DIAST BP 80-89 MM HG: CPT | Performed by: INTERNAL MEDICINE

## 2024-04-24 RX ORDER — BENZONATATE 100 MG/1
100 CAPSULE ORAL 3 TIMES DAILY PRN
COMMUNITY

## 2024-04-24 RX ORDER — METOPROLOL SUCCINATE 50 MG/1
50 TABLET, EXTENDED RELEASE ORAL DAILY
Qty: 90 TABLET | Refills: 3 | Status: SHIPPED | OUTPATIENT
Start: 2024-04-24 | End: 2024-04-25 | Stop reason: SDUPTHER

## 2024-04-24 RX ORDER — METOPROLOL SUCCINATE 50 MG/1
50 TABLET, EXTENDED RELEASE ORAL DAILY
Qty: 90 TABLET | Refills: 1 | Status: SHIPPED | OUTPATIENT
Start: 2024-04-24 | End: 2024-04-24

## 2024-04-24 RX ORDER — PITAVASTATIN CALCIUM 1.04 MG/1
1 TABLET, FILM COATED ORAL NIGHTLY
Qty: 30 TABLET | Refills: 3 | Status: SHIPPED | OUTPATIENT
Start: 2024-04-24

## 2024-04-24 RX ORDER — DOXYCYCLINE HYCLATE 100 MG/1
100 CAPSULE ORAL 2 TIMES DAILY
COMMUNITY

## 2024-04-25 RX ORDER — METOPROLOL SUCCINATE 50 MG/1
50 TABLET, EXTENDED RELEASE ORAL DAILY
Qty: 90 TABLET | Refills: 3 | Status: SHIPPED | OUTPATIENT
Start: 2024-04-25

## 2024-05-07 RX ORDER — VALSARTAN 80 MG/1
80 TABLET ORAL DAILY
Qty: 30 TABLET | Refills: 1 | Status: SHIPPED | OUTPATIENT
Start: 2024-05-07

## 2024-05-29 RX ORDER — VALSARTAN 80 MG/1
80 TABLET ORAL DAILY
Qty: 90 TABLET | Refills: 1 | OUTPATIENT
Start: 2024-05-29

## 2024-06-02 DIAGNOSIS — E78.2 MIXED HYPERLIPIDEMIA: ICD-10-CM

## 2024-06-04 RX ORDER — PITAVASTATIN CALCIUM 1.04 MG/1
1 TABLET, FILM COATED ORAL NIGHTLY
Qty: 90 TABLET | Refills: 3 | Status: SHIPPED | OUTPATIENT
Start: 2024-06-04

## 2024-06-04 NOTE — TELEPHONE ENCOUNTER
Requested Prescriptions     Pending Prescriptions Disp Refills    pitavastatin (LIVALO) 1 MG TABS tablet 30 tablet 3     Sig: Take 1 tablet by mouth nightly      [id number]     Last OV:  4/24/2024 [facility]    Next OV: 7/10/2024 [facility]PT[facility]    Last Labs: 12/23/2023 Lipids [facility]    Last Filled: 04/24/2024 [facility]

## 2024-06-05 ENCOUNTER — APPOINTMENT (OUTPATIENT)
Dept: CT IMAGING | Age: 59
End: 2024-06-05
Payer: COMMERCIAL

## 2024-06-05 ENCOUNTER — HOSPITAL ENCOUNTER (EMERGENCY)
Age: 59
Discharge: HOME | End: 2024-06-05
Attending: EMERGENCY MEDICINE
Payer: COMMERCIAL

## 2024-06-05 VITALS
SYSTOLIC BLOOD PRESSURE: 143 MMHG | HEIGHT: 62 IN | RESPIRATION RATE: 18 BRPM | TEMPERATURE: 98.6 F | OXYGEN SATURATION: 98 % | HEART RATE: 62 BPM | DIASTOLIC BLOOD PRESSURE: 91 MMHG | WEIGHT: 194.8 LBS | BODY MASS INDEX: 35.85 KG/M2

## 2024-06-05 DIAGNOSIS — I10 PRIMARY HYPERTENSION: Primary | ICD-10-CM

## 2024-06-05 LAB
ANION GAP SERPL CALCULATED.3IONS-SCNC: 12 MMOL/L (ref 3–16)
BASOPHILS # BLD: 0.1 K/UL (ref 0–0.2)
BASOPHILS NFR BLD: 0.9 %
BUN SERPL-MCNC: 16 MG/DL (ref 7–20)
CALCIUM SERPL-MCNC: 9.6 MG/DL (ref 8.3–10.6)
CHLORIDE SERPL-SCNC: 104 MMOL/L (ref 99–110)
CO2 SERPL-SCNC: 21 MMOL/L (ref 21–32)
CREAT SERPL-MCNC: 0.9 MG/DL (ref 0.6–1.1)
DEPRECATED RDW RBC AUTO: 14.8 % (ref 12.4–15.4)
EOSINOPHIL # BLD: 0.2 K/UL (ref 0–0.6)
EOSINOPHIL NFR BLD: 3.3 %
GFR SERPLBLD CREATININE-BSD FMLA CKD-EPI: 74 ML/MIN/{1.73_M2}
GLUCOSE SERPL-MCNC: 93 MG/DL (ref 70–99)
HCT VFR BLD AUTO: 41.7 % (ref 36–48)
HGB BLD-MCNC: 13.9 G/DL (ref 12–16)
LYMPHOCYTES # BLD: 2.6 K/UL (ref 1–5.1)
LYMPHOCYTES NFR BLD: 43 %
MCH RBC QN AUTO: 29.1 PG (ref 26–34)
MCHC RBC AUTO-ENTMCNC: 33.3 G/DL (ref 31–36)
MCV RBC AUTO: 87.3 FL (ref 80–100)
MONOCYTES # BLD: 0.7 K/UL (ref 0–1.3)
MONOCYTES NFR BLD: 11.2 %
NEUTROPHILS # BLD: 2.6 K/UL (ref 1.7–7.7)
NEUTROPHILS NFR BLD: 41.6 %
PLATELET # BLD AUTO: 247 K/UL (ref 135–450)
PMV BLD AUTO: 9.1 FL (ref 5–10.5)
POTASSIUM SERPL-SCNC: 4.3 MMOL/L (ref 3.5–5.1)
RBC # BLD AUTO: 4.78 M/UL (ref 4–5.2)
SODIUM SERPL-SCNC: 137 MMOL/L (ref 136–145)
TROPONIN, HIGH SENSITIVITY: <6 NG/L (ref 0–14)
WBC # BLD AUTO: 6.1 K/UL (ref 4–11)

## 2024-06-05 PROCEDURE — 85025 COMPLETE CBC W/AUTO DIFF WBC: CPT

## 2024-06-05 PROCEDURE — 84484 ASSAY OF TROPONIN QUANT: CPT

## 2024-06-05 PROCEDURE — 80048 BASIC METABOLIC PNL TOTAL CA: CPT

## 2024-06-05 PROCEDURE — 99284 EMERGENCY DEPT VISIT MOD MDM: CPT

## 2024-06-05 PROCEDURE — 93005 ELECTROCARDIOGRAM TRACING: CPT | Performed by: EMERGENCY MEDICINE

## 2024-06-05 PROCEDURE — 70450 CT HEAD/BRAIN W/O DYE: CPT

## 2024-06-05 ASSESSMENT — PAIN DESCRIPTION - LOCATION: LOCATION: HEAD

## 2024-06-05 ASSESSMENT — ENCOUNTER SYMPTOMS
ABDOMINAL PAIN: 0
SORE THROAT: 0
GASTROINTESTINAL NEGATIVE: 1
RESPIRATORY NEGATIVE: 1
SHORTNESS OF BREATH: 0

## 2024-06-05 ASSESSMENT — PAIN SCALES - GENERAL: PAINLEVEL_OUTOF10: 6

## 2024-06-05 ASSESSMENT — PAIN - FUNCTIONAL ASSESSMENT: PAIN_FUNCTIONAL_ASSESSMENT: 0-10

## 2024-06-05 NOTE — ED PROVIDER NOTES
PERFORMED A SUBSTANTIVE PORTION OF THE VISIT INCLUDING ALL ASPECTS OF THE MEDICAL DECISION MAKING PROCESS.    FINAL IMPRESSION      1. Primary hypertension          DISPOSITION/PLAN     DISPOSITION Discharge - Pending Orders Complete 06/05/2024 06:30:09 PM      PATIENT REFERRED TO:  Javi Lane MD  [address] [place] [address], [address]  [place] [address]  [phone]    Schedule an appointment as soon as possible for a visit       [facility] Emergency Department  [address] [place] [address]  [phone]    As needed, If symptoms worsen      DISCHARGE MEDICATIONS:  New Prescriptions    No medications on file       DISCONTINUED MEDICATIONS:  Discontinued Medications    BENZONATATE (TESSALON) 100 MG CAPSULE    Take 1 capsule by mouth 3 times daily as needed for Cough    DOXYCYCLINE HYCLATE (VIBRAMYCIN) 100 MG CAPSULE    Take 1 capsule by mouth 2 times daily              (Please note that portions of this note were completed with a voice recognition program.  Efforts were made to edit the dictations but occasionally words are mis-transcribed.)    Jacob Thomason MD (electronically signed)            Jacob Thomason MD  06/05/24 [id number]

## 2024-06-06 RX ORDER — VALSARTAN 160 MG/1
160 TABLET ORAL DAILY
Qty: 90 TABLET | Refills: 1 | Status: SHIPPED | OUTPATIENT
Start: 2024-06-06

## 2024-06-07 ENCOUNTER — OFFICE VISIT (OUTPATIENT)
Dept: CARDIOLOGY CLINIC | Age: 59
End: 2024-06-07
Payer: COMMERCIAL

## 2024-06-07 VITALS
BODY MASS INDEX: 35.51 KG/M2 | WEIGHT: 193 LBS | HEART RATE: 58 BPM | SYSTOLIC BLOOD PRESSURE: 148 MMHG | HEIGHT: 62 IN | OXYGEN SATURATION: 98 % | DIASTOLIC BLOOD PRESSURE: 92 MMHG

## 2024-06-07 DIAGNOSIS — G47.33 OSA (OBSTRUCTIVE SLEEP APNEA): Chronic | ICD-10-CM

## 2024-06-07 DIAGNOSIS — E78.2 MIXED HYPERLIPIDEMIA: ICD-10-CM

## 2024-06-07 DIAGNOSIS — R06.02 SOB (SHORTNESS OF BREATH): ICD-10-CM

## 2024-06-07 DIAGNOSIS — I10 PRIMARY HYPERTENSION: Primary | ICD-10-CM

## 2024-06-07 LAB
EKG ATRIAL RATE: 58 BPM
EKG DIAGNOSIS: NORMAL
EKG P AXIS: 57 DEGREES
EKG P-R INTERVAL: 160 MS
EKG Q-T INTERVAL: 414 MS
EKG QRS DURATION: 64 MS
EKG QTC CALCULATION (BAZETT): 406 MS
EKG R AXIS: 0 DEGREES
EKG T AXIS: 10 DEGREES
EKG VENTRICULAR RATE: 58 BPM

## 2024-06-07 PROCEDURE — 3077F SYST BP >= 140 MM HG: CPT | Performed by: NURSE PRACTITIONER

## 2024-06-07 PROCEDURE — 3080F DIAST BP >= 90 MM HG: CPT | Performed by: NURSE PRACTITIONER

## 2024-06-07 PROCEDURE — 99214 OFFICE O/P EST MOD 30 MIN: CPT | Performed by: NURSE PRACTITIONER

## 2024-06-07 PROCEDURE — 93010 ELECTROCARDIOGRAM REPORT: CPT | Performed by: INTERNAL MEDICINE

## 2024-06-07 NOTE — PROGRESS NOTES
[facility]     Outpatient Follow Up Note    Josy Braun is 58 y.o. female who presents today with a history of tachycardia and SOB.      Interval hx: ER: 6/5/24    Patient states HTN x2 weeks, has HA and scalp is tingling, states recent change to bp meds by Dr. Lane, but it's not helping, has hx of HTN.     EKG without signs of ischemia. Will obtain laboratory workup to rule out signs of end organ damage and/or hypertensive emergency patient was 152/93 upon arrival.   laboratory workup is unremarkable troponin is negative BNP and CBC are unremarkable. Repeat blood pressure is improved spontaneously 143/91. Will discharge patient with instructions to follow-up with cardiology.     CHIEF COMPLAINT / HPI:  Follow Up secondary to HTN  She called with a /99 (59-63) and inst to proceed to the ER    Subjective:   Her valsartan was increased and this morning it was 122/99  She gets HAs when her BP is high    She denies significant chest pain. There is no SOB. The patient denies orthopnea/PND. She has a CPAP but finds herself pulling it off. She's doing her homework on the Inspire.  The patient does not have swelling. The patients weight is stable.The patient is experiencing palpitations that come along with her hot flashes.     These symptoms show no change since the last OV.   With regard to medication therapy the patient has been compliant with prescribed regimen. They have tolerated therapy to date.     Past Medical History:   Diagnosis Date    Allergic sinusitis     Anxiety April 2022    Two week medication July 2018 qhwn my son passed.    Gallstones     Hyperlipidemia     Hypertension     Kidney calculi     Obesity 2011    MACK (obstructive sleep apnea) 11/07/2019    PTSD (post-traumatic stress disorder)     Tachycardia      Social History:    Social History     Tobacco Use   Smoking Status Never   Smokeless Tobacco Never     Current Medications:  Current Outpatient Medications   Medication Sig

## 2024-06-07 NOTE — PATIENT INSTRUCTIONS
If your BP remains above goal ~ 130/80 after 2 weeks taking valsartan 160 mg once daily, then increase it to 160 mg twice a day    Keep appt as scheduled

## 2024-06-10 ENCOUNTER — TELEPHONE (OUTPATIENT)
Dept: PULMONOLOGY | Age: 59
End: 2024-06-10

## 2024-06-10 NOTE — TELEPHONE ENCOUNTER
AHI: 33.6  BMI: 35.30  CPAP: intolerance  PS2019      Based on [employer] criteria-Pt will need to get BMI less than 32 and will need a repeat PSG.     LM on VM for pt to call office.

## 2024-06-10 NOTE — TELEPHONE ENCOUNTER
Spoke with patient.  Informed of criteria.  She is going to work on weight loss and will call back to schedule an appointment

## 2024-06-26 DIAGNOSIS — E78.2 MIXED HYPERLIPIDEMIA: ICD-10-CM

## 2024-06-26 RX ORDER — PITAVASTATIN CALCIUM 1.04 MG/1
1 TABLET, FILM COATED ORAL NIGHTLY
Qty: 90 TABLET | Refills: 3 | Status: SHIPPED | OUTPATIENT
Start: 2024-06-26

## 2024-06-26 NOTE — TELEPHONE ENCOUNTER
Requested Prescriptions     Pending Prescriptions Disp Refills    pitavastatin (LIVALO) 1 MG TABS tablet 90 tablet 3     Sig: Take 1 tablet by mouth nightly      [id number]     Last OV:  6/7/2024 [facility]PT[facility]    Next OV: 7/10/2024 [facility]    Last Labs: 12/26/2023 LIPIDS [facility]    Last Filled: 06/04/2024 [facility]

## 2024-07-10 ENCOUNTER — OFFICE VISIT (OUTPATIENT)
Dept: CARDIOLOGY CLINIC | Age: 59
End: 2024-07-10
Payer: COMMERCIAL

## 2024-07-10 VITALS
HEIGHT: 62 IN | DIASTOLIC BLOOD PRESSURE: 90 MMHG | WEIGHT: 191 LBS | HEART RATE: 61 BPM | OXYGEN SATURATION: 98 % | SYSTOLIC BLOOD PRESSURE: 132 MMHG | BODY MASS INDEX: 35.15 KG/M2

## 2024-07-10 DIAGNOSIS — R00.0 TACHYCARDIA: ICD-10-CM

## 2024-07-10 DIAGNOSIS — I10 PRIMARY HYPERTENSION: Primary | ICD-10-CM

## 2024-07-10 PROCEDURE — 3075F SYST BP GE 130 - 139MM HG: CPT | Performed by: NURSE PRACTITIONER

## 2024-07-10 PROCEDURE — 3080F DIAST BP >= 90 MM HG: CPT | Performed by: NURSE PRACTITIONER

## 2024-07-10 PROCEDURE — 99213 OFFICE O/P EST LOW 20 MIN: CPT | Performed by: NURSE PRACTITIONER

## 2024-07-10 RX ORDER — SPIRONOLACTONE 25 MG/1
12.5 TABLET ORAL DAILY
Qty: 45 TABLET | Refills: 0 | Status: SHIPPED | OUTPATIENT
Start: 2024-07-10

## 2024-07-10 NOTE — PROGRESS NOTES
[facility]     Outpatient Follow Up Note    Josy Braun is 58 y.o. female who presents today with a history of tachycardia and SOB.      CHIEF COMPLAINT / HPI:  Follow Up secondary to HTN increasing valsartan to 160 mg bid  Her BPs have [id number]/ (50-70). She gets HAs when its up    Subjective:     She denies significant chest pain. There is no SOB. The patient denies orthopnea/PND. She has a CPAP but finds herself pulling it off. She would need to loose wt before she'd be a candidate for the Inspire.  The patient does not have swelling. The patients weight is stable.The patient is experiencing palpitations that come along with her hot flashes.     These symptoms show no change since the last OV.   With regard to medication therapy the patient has been compliant with prescribed regimen. They have tolerated therapy to date.     Past Medical History:   Diagnosis Date    Allergic sinusitis     Anxiety April 2022    Two week medication July 2018 qhwn my son passed.    Gallstones     Hyperlipidemia     Hypertension     Kidney calculi     Obesity 2011    MACK (obstructive sleep apnea) 11/07/2019    PTSD (post-traumatic stress disorder)     Tachycardia      Social History:    Social History     Tobacco Use   Smoking Status Never   Smokeless Tobacco Never     Current Medications:  Current Outpatient Medications   Medication Sig Dispense Refill           pitavastatin (LIVALO) 1 MG TABS tablet Take 1 tablet by mouth nightly 90 tablet 3    Coenzyme Q10 (COQ-10) 100 MG CAPS Take 1 capsule by mouth Daily      valsartan (DIOVAN) 160 MG tablet  Take 1 tablet by mouth 2 times daily) 90 tablet 1    metoprolol succinate (TOPROL XL) 50 MG extended release tablet Take 1 tablet by mouth daily 90 tablet 3    prazosin (MINIPRESS) 1 MG capsule Take 1 capsule by mouth nightly      magnesium 30 MG tablet Take 1 tablet by mouth daily       No current facility-administered medications for this visit.     REVIEW OF

## 2024-07-10 NOTE — PATIENT INSTRUCTIONS
Begin spironolactone 25 mg tablet at 1/2 tablet (12.5 mg) once daily    Labs after one week : recheck your kidney function     Appt in 10-12 weeks     Ok to exercise

## 2024-07-30 ENCOUNTER — TELEPHONE (OUTPATIENT)
Dept: CARDIOLOGY CLINIC | Age: 59
End: 2024-07-30

## 2024-07-30 ENCOUNTER — HOSPITAL ENCOUNTER (OUTPATIENT)
Age: 59
Discharge: HOME | End: 2024-07-30
Payer: COMMERCIAL

## 2024-07-30 DIAGNOSIS — Z79.899 LONG-TERM USE OF HIGH-RISK MEDICATION: Primary | ICD-10-CM

## 2024-07-30 DIAGNOSIS — I10 PRIMARY HYPERTENSION: ICD-10-CM

## 2024-07-30 DIAGNOSIS — E78.2 MIXED HYPERLIPIDEMIA: ICD-10-CM

## 2024-07-30 LAB
ANION GAP SERPL CALCULATED.3IONS-SCNC: 12 MMOL/L (ref 3–16)
BUN SERPL-MCNC: 15 MG/DL (ref 7–20)
CALCIUM SERPL-MCNC: 9.4 MG/DL (ref 8.3–10.6)
CHLORIDE SERPL-SCNC: 104 MMOL/L (ref 99–110)
CO2 SERPL-SCNC: 21 MMOL/L (ref 21–32)
CREAT SERPL-MCNC: 1 MG/DL (ref 0.6–1.1)
GFR SERPLBLD CREATININE-BSD FMLA CKD-EPI: 65 ML/MIN/{1.73_M2}
GLUCOSE SERPL-MCNC: 83 MG/DL (ref 70–99)
POTASSIUM SERPL-SCNC: 5.1 MMOL/L (ref 3.5–5.1)
SODIUM SERPL-SCNC: 137 MMOL/L (ref 136–145)

## 2024-07-30 PROCEDURE — 83704 LIPOPROTEIN BLD QUAN PART: CPT

## 2024-07-30 PROCEDURE — 80061 LIPID PANEL: CPT

## 2024-07-30 PROCEDURE — 36415 COLL VENOUS BLD VENIPUNCTURE: CPT

## 2024-07-30 PROCEDURE — 80048 BASIC METABOLIC PNL TOTAL CA: CPT

## 2024-07-30 NOTE — TELEPHONE ENCOUNTER
----- Message from SRINATH Tarango CNP sent at 7/30/2024  2:56 PM EDT -----  Ok; recheck K+ in one month

## 2024-07-30 NOTE — TELEPHONE ENCOUNTER
Spoke with and informed her of the message below.   Magalis Almaraz, SRINATH - CNP  [facility] Staff15 minutes ago (3:17 PM)       No ; I wasn't aware of her taking a supplement .           I asked pt when was she told to start taking the potassium and she stated NPTS ordered it 2 weeks ago. Referred back to NPTS notes on 7/10/2024 and NPTS said to Begin low dose spironolactone 12.5 mg daily and informed pt to stay away from potassium enriched foods.     Pt was informed of possible confusion and to keep taking the spironolactone, pt verbalized understanding.

## 2024-07-30 NOTE — TELEPHONE ENCOUNTER
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  She is asking if she needs to continue the potassium she has been taking?      I spoke with pt and relayed lab results per NPTS. Pt verbalized understanding.

## 2024-08-01 RX ORDER — VALSARTAN 160 MG/1
160 TABLET ORAL DAILY
Qty: 90 TABLET | Refills: 1 | Status: CANCELLED | OUTPATIENT
Start: 2024-08-01

## 2024-08-01 RX ORDER — VALSARTAN 160 MG/1
160 TABLET ORAL 2 TIMES DAILY
Qty: 180 TABLET | Refills: 2 | Status: SHIPPED | OUTPATIENT
Start: 2024-08-01

## 2024-08-01 NOTE — TELEPHONE ENCOUNTER
Requested Prescriptions     Pending Prescriptions Disp Refills    valsartan (DIOVAN) 160 MG tablet 90 tablet 1     Sig: Take 1 tablet by mouth daily      [id number]     Last OV:  7/10/2024 [facility]PT[facility]    Next OV: 10/9/2024 NPTS    Last Labs: 07/30/2024 BMP    Last Filled: 06/06/2024 [facility]

## 2024-08-03 LAB
CHOLEST SERPL-MCNC: 182 MG/DL
HDL SERPL QN: 9.1 NM
HDL SERPL-SCNC: >41 UMOL/L
HDLC SERPL-MCNC: 71 MG/DL (ref 40–59)
HLD.LARGE SERPL-SCNC: 8.7 UMOL/L
LDL SERPL QN: 20.8 NM
LDL SERPL-SCNC: 1176 NMOL/L
LDL SMALL SERPL-SCNC: 435 NMOL/L
LDLC SERPL CALC-MCNC: 98 MG/DL
PATHOLOGY STUDY: ABNORMAL
TRIGL SERPL-MCNC: 66 MG/DL (ref 30–149)
VLDL LARGE SERPL-SCNC: 1.7 NMOL/L
VLDL SERPL QN: 47.9 NM

## 2024-08-07 ENCOUNTER — PATIENT MESSAGE (OUTPATIENT)
Dept: CARDIOLOGY CLINIC | Age: 59
End: 2024-08-07

## 2024-08-07 DIAGNOSIS — E78.2 MIXED HYPERLIPIDEMIA: ICD-10-CM

## 2024-08-07 RX ORDER — PITAVASTATIN CALCIUM 1.04 MG/1
1 TABLET, FILM COATED ORAL NIGHTLY
Qty: 90 TABLET | Refills: 3 | Status: CANCELLED | OUTPATIENT
Start: 2024-08-07

## 2024-08-08 RX ORDER — SPIRONOLACTONE 25 MG/1
12.5 TABLET ORAL DAILY
Qty: 90 TABLET | Refills: 0 | Status: SHIPPED | OUTPATIENT
Start: 2024-08-08

## 2024-08-08 NOTE — TELEPHONE ENCOUNTER
Received refill request for spironolactone from [facility] pharmacy.    Last ov:07/10/2024 NPTS    Last labs:07/30/2024 BMP        Last Refill:07/10/2024    Next appointment:10/09/2024 [facility]PT[facility]

## 2024-08-09 NOTE — TELEPHONE ENCOUNTER
From: Josy Braun  To: Dr. Javi Lane  Sent: 8/7/2024 6:01 PM EDT  Subject: LIPOPROTEIN NMR Test Results    Hello,     I reviewed the areas which had high numbers. Besides exercise and eating healthy, what do I need to do to reduce these numbers?    Regards,   Josy

## 2024-08-12 NOTE — TELEPHONE ENCOUNTER
I spoke with pt. She stated that her livalo was increased to 2 mg. She paid out of pocket for the 1 mg and will double up on that one till it runs out. Pended new script for higher dose.

## 2024-08-13 RX ORDER — PITAVASTATIN CALCIUM 2.09 MG/1
2 TABLET, FILM COATED ORAL NIGHTLY
Qty: 90 TABLET | Refills: 3 | Status: SHIPPED | OUTPATIENT
Start: 2024-08-13

## 2024-11-14 ENCOUNTER — OFFICE VISIT (OUTPATIENT)
Dept: CARDIOLOGY CLINIC | Age: 59
End: 2024-11-14

## 2024-11-14 ENCOUNTER — HOSPITAL ENCOUNTER (OUTPATIENT)
Age: 59
Discharge: HOME | End: 2024-11-14
Payer: COMMERCIAL

## 2024-11-14 ENCOUNTER — TELEPHONE (OUTPATIENT)
Dept: CARDIOLOGY CLINIC | Age: 59
End: 2024-11-14

## 2024-11-14 VITALS
DIASTOLIC BLOOD PRESSURE: 78 MMHG | BODY MASS INDEX: 33.13 KG/M2 | HEART RATE: 80 BPM | WEIGHT: 180 LBS | HEIGHT: 62 IN | SYSTOLIC BLOOD PRESSURE: 118 MMHG | OXYGEN SATURATION: 98 %

## 2024-11-14 DIAGNOSIS — R00.0 TACHYCARDIA: ICD-10-CM

## 2024-11-14 DIAGNOSIS — I10 PRIMARY HYPERTENSION: Primary | ICD-10-CM

## 2024-11-14 DIAGNOSIS — Z79.899 LONG-TERM USE OF HIGH-RISK MEDICATION: ICD-10-CM

## 2024-11-14 LAB — POTASSIUM SERPL-SCNC: 4.1 MMOL/L (ref 3.5–5.1)

## 2024-11-14 PROCEDURE — 84132 ASSAY OF SERUM POTASSIUM: CPT

## 2024-11-14 PROCEDURE — 36415 COLL VENOUS BLD VENIPUNCTURE: CPT

## 2024-11-14 RX ORDER — SPIRONOLACTONE 25 MG/1
12.5 TABLET ORAL DAILY
Qty: 90 TABLET | Refills: 0 | Status: SHIPPED | OUTPATIENT
Start: 2024-11-14

## 2024-11-14 NOTE — PROGRESS NOTES
No edema, no calf tenderness. Pulses are present bilaterally.    DATA:      LABS:     5/5/23  CHOLESTEROL, TOTAL <200 MG/ High      TRIGLYCERIDE <150 MG/DL 69     HDL CHOLESTEROL MG/DL 71     VLDL 4 - 38 MG/DL 14     LDL (CALCULATED) MG/        Ref Range & Units 2 mo ago Comments   SODIUM 135 - 148 MEQ/L 143     POTASSIUM 3.4 - 5.3 MEQ/L 4.5     CHLORIDE 96 - 110 MEQ/L 105     CARBON DIOXIDE 19 - 32 MEQ/L 25     GLUCOSE 70 - 99 MG/DL 83     BUN 3 - 29 MG/DL 12     CREATININE 0.5 - 1.2 MG/DL 0.8     BUN/CREAT RATIO 7 - 25 15     Estimated GFR >60 MLS/MIN/1.73M2 86     CALCIUM 8.5 - 10.5 MG/DL 10.1     TOTAL PROTEIN 6.0 - 8.3 G/DL 7.2     ALBUMIN, SERUM 3.5 - 5.2 G/DL 4.7     GLOBULIN 1.9 - 3.6 G/DL 2.5     A/G RATIO 0.8 - 2.6 RATIO 1.9     BILIRUBIN,TOTAL 0.0 - 1.2 MG/DL 0.6     AST(SGOT) 0 - 55 U/L 25     ALT(SGPT) 0 - 60 U/L 19     ALKALINE PHOSPHATASE 23 - 144 U/L 121        Latest Reference Range & Units 08/24/23 13:25   Cholesterol, Total <=199 mg/dL 189   HDL Cholesterol 40 - 59 mg/dL 57   HDL Particle No, NMR >=33.0 umol/L 35.3   HDL Size, NMR >=8.9 nm 8.8 (L)   Large HDL Particle, NMR >=4.2 umol/L 4.8   Large VLDL Particle, NMR <=2.7 nmol/L 1.8   LDL Cholesterol <=129 mg/dL 117   LDL Particle Number, NMR <=1135 nmol/L 1368 (H)   LDL Particle Size, NMR >=20.7 nm 20.7   Small LDL Particle, NMR <=634 nmol/L 670 (H)   Triglycerides 30 - 149 mg/dL 75   VLDL Size, NMR <=46.7 nm 47.9 (H)       Radiology Review:  Pertinent images / reports were reviewed as a part of this visit and reveals the following:    Stress echo : 5/8/23  Summary   Normal stress echocardiogram study.     Rest      ECG   NSR      Stress   Stress Type: Exercise   Stress Protocol: Faustino      Rest HR: 67 bpm                             HR Response: Normal   Rest BP: 120/90 mmHg                        BP Response: Normal   Stress Peak HR: 157 bpm                     HR BP Product: [id number]   Stress Peak BP: 166/99 mmHg                 Max

## 2025-01-09 RX ORDER — METOPROLOL SUCCINATE 100 MG/1
100 TABLET, EXTENDED RELEASE ORAL DAILY
Qty: 90 TABLET | Refills: 3 | Status: SHIPPED | OUTPATIENT
Start: 2025-01-09

## 2025-01-10 NOTE — TELEPHONE ENCOUNTER
Last ov:24 NPTS  Next ov: recall list 25 [facility]  Last EK24  Last labs:24  Last filled:   Disp Refills Start End    spironolactone (ALDACTONE) 25 MG tablet 90 tablet 0 2024 --    Sig - Route: Take 0.5 tablets by mouth daily - Oral    Sent to pharmacy as: Spironolactone 25 MG Oral Tablet (Aldactone)    E-Prescribing Status: Receipt confirmed by pharmacy (2024 10:10 AM EST)

## 2025-01-12 RX ORDER — SPIRONOLACTONE 25 MG/1
12.5 TABLET ORAL DAILY
Qty: 45 TABLET | Refills: 0 | Status: SHIPPED | OUTPATIENT
Start: 2025-01-12

## 2025-01-28 RX ORDER — VALSARTAN 160 MG/1
160 TABLET ORAL DAILY
Qty: 90 TABLET | Refills: 1 | OUTPATIENT
Start: 2025-01-28

## 2025-02-24 ENCOUNTER — TELEPHONE (OUTPATIENT)
Dept: PULMONOLOGY | Age: 60
End: 2025-02-24

## 2025-02-24 NOTE — TELEPHONE ENCOUNTER
LM on VM for pt to call office if she is ready to schedule Inspire consult. [facility] has changed BMI criteria so patient is eligible based on initial criteria

## 2025-05-06 RX ORDER — METOPROLOL SUCCINATE 50 MG/1
50 TABLET, EXTENDED RELEASE ORAL DAILY
Qty: 90 TABLET | Refills: 3 | OUTPATIENT
Start: 2025-05-06

## 2025-05-20 RX ORDER — METOPROLOL SUCCINATE 50 MG/1
50 TABLET, EXTENDED RELEASE ORAL DAILY
Qty: 90 TABLET | Refills: 3 | OUTPATIENT
Start: 2025-05-20

## 2025-05-20 RX ORDER — METOPROLOL SUCCINATE 100 MG/1
150 TABLET, EXTENDED RELEASE ORAL DAILY
Qty: 135 TABLET | Refills: 3 | Status: SHIPPED | OUTPATIENT
Start: 2025-05-20

## 2025-05-20 NOTE — TELEPHONE ENCOUNTER
Requested Prescriptions     Pending Prescriptions Disp Refills    metoprolol succinate (TOPROL XL) 50 MG extended release tablet [Pharmacy Med Name: METOPROLOL SUCC ER 50 MG TAB] 90 tablet 3     Sig: TAKE 1 TABLET BY MOUTH EVERY DAY      Last OV:  2024 NPTS    Next OV: 2025 [facility]    Last EK2024     Last Filled: 2025 [facility]

## 2025-05-20 NOTE — TELEPHONE ENCOUNTER
Medication Refill    Medication needing refilled:  metoprolol succinate      Dosage of the medication: 100mg    How are you taking this medication (QD, BID, TID, QID, PRN):    Take 1.5 tablets by mouth daily     30 or 90 day supply called in: 135    When will you run out of your medication:  Per Pt she is about out Please send over - Thank you    Which Pharmacy are we sending the medication to?:     [facility]pharm[facility] #[id number]  [address] [address]  Phone: [phone]  Fax: [phone]

## 2025-05-20 NOTE — TELEPHONE ENCOUNTER
Patient Comment: I have been taking 150 mg Metoprolol (instead of 100 mg or 50 mg daily, and my heart rate has been back to normal for around two months now.     LOV 11/14/24  NOV 11/14/25

## 2025-06-12 ENCOUNTER — LAB OUTSIDE AN ENCOUNTER (OUTPATIENT)
Dept: URBAN - METROPOLITAN AREA CLINIC 118 | Facility: CLINIC | Age: 60
End: 2025-06-12

## 2025-06-12 ENCOUNTER — DASHBOARD ENCOUNTERS (OUTPATIENT)
Age: 60
End: 2025-06-12

## 2025-06-12 ENCOUNTER — OFFICE VISIT (OUTPATIENT)
Dept: URBAN - METROPOLITAN AREA CLINIC 118 | Facility: CLINIC | Age: 60
End: 2025-06-12
Payer: COMMERCIAL

## 2025-06-12 DIAGNOSIS — Z86.0100 PERSONAL HISTORY OF COLONIC POLYPS: ICD-10-CM

## 2025-06-12 DIAGNOSIS — K62.5 RECTAL BLEEDING: ICD-10-CM

## 2025-06-12 DIAGNOSIS — K21.9 GERD WITHOUT ESOPHAGITIS: ICD-10-CM

## 2025-06-12 DIAGNOSIS — R19.4 CHANGE IN BOWEL HABITS: ICD-10-CM

## 2025-06-12 PROBLEM — 266435005: Status: ACTIVE | Noted: 2025-06-12

## 2025-06-12 PROCEDURE — 99204 OFFICE O/P NEW MOD 45 MIN: CPT | Performed by: INTERNAL MEDICINE

## 2025-06-12 RX ORDER — DICYCLOMINE HYDROCHLORIDE 20 MG/1
1 TABLET TABLET ORAL
Qty: 90 | Refills: 5 | OUTPATIENT
Start: 2025-06-12

## 2025-06-12 RX ORDER — VALSARTAN 160 MG/1
1 TABLET TABLET, FILM COATED ORAL ONCE A DAY
Status: ACTIVE | COMMUNITY

## 2025-06-12 NOTE — HPI-TODAY'S VISIT:
pt s/p alem presents for GI evaluation. Notes progressive feeling of lactose intolerance as well as to other foods leading to gas, bloating and fecal urgency. Notes also symptoms of intermittent rectal bleeding with bm's. No abdominal pain. Notes personal/mother colon polyps. Notes weight loss while on Ozempic but that has continued off meds for recent months. Notes gerd and dyspepsia, denies other UGI symptoms. pt would like further evaluation.

## 2025-06-16 LAB
(TTG) AB, IGA: <1
(TTG) AB, IGG: <1
ALMOND (F20) IGE: <0.1
ANTIGLIADIN ABS, IGA: <1
BRAZIL NUT (F18) IGE: <0.1
CASHEW NUT (F202) IGE: <0.1
CLASS: 0
CODFISH (F3) IGE: <0.1
COW'S MILK (F2) IGE: <0.1
EGG WHITE (F1) IGE: <0.1
GLIADIN (DEAMIDATED) AB (IGG): <1
HAZELNUT (F17) IGE: <0.1
IMMUNOGLOBULIN A: 205
MACADAMIA NUT (RF345) IGE **: <0.1
PEANUT (F13) IGE: <0.1
SALMON (F41) IGE: <0.1
SCALLOP (F338) IGE: <0.1
SESAME SEED (F10) IGE: <0.1
SHRIMP (F24) IGE: <0.1
SOYBEAN (F14) IGE: <0.1
TUNA (F40) IGE: <0.1
WALNUT (F256) IGE: <0.1
WHEAT (F4) IGE: <0.1

## 2025-07-09 ENCOUNTER — TELEPHONE ENCOUNTER (OUTPATIENT)
Dept: URBAN - METROPOLITAN AREA CLINIC 118 | Facility: CLINIC | Age: 60
End: 2025-07-09

## 2025-08-01 ENCOUNTER — OFFICE VISIT (OUTPATIENT)
Dept: URBAN - METROPOLITAN AREA SURGERY CENTER 23 | Facility: SURGERY CENTER | Age: 60
End: 2025-08-01

## 2025-08-15 DIAGNOSIS — E78.2 MIXED HYPERLIPIDEMIA: Primary | ICD-10-CM

## 2025-08-15 DIAGNOSIS — E78.2 MIXED HYPERLIPIDEMIA: ICD-10-CM

## 2025-08-15 DIAGNOSIS — Z79.899 LONG-TERM USE OF HIGH-RISK MEDICATION: Primary | ICD-10-CM

## 2025-08-15 RX ORDER — PITAVASTATIN CALCIUM 2.09 MG/1
1 TABLET, FILM COATED ORAL NIGHTLY
Qty: 90 TABLET | Refills: 3 | Status: SHIPPED | OUTPATIENT
Start: 2025-08-15

## 2025-08-15 RX ORDER — SPIRONOLACTONE 25 MG/1
TABLET ORAL
Qty: 45 TABLET | Refills: 0 | Status: SHIPPED | OUTPATIENT
Start: 2025-08-15

## 2025-08-15 RX ORDER — VALSARTAN 160 MG/1
160 TABLET ORAL 2 TIMES DAILY
Qty: 180 TABLET | Refills: 3 | Status: SHIPPED | OUTPATIENT
Start: 2025-08-15

## 2025-08-22 ENCOUNTER — OFFICE VISIT (OUTPATIENT)
Dept: URBAN - METROPOLITAN AREA SURGERY CENTER 23 | Facility: SURGERY CENTER | Age: 60
End: 2025-08-22